# Patient Record
Sex: MALE | Race: ASIAN | NOT HISPANIC OR LATINO | Employment: UNEMPLOYED | URBAN - METROPOLITAN AREA
[De-identification: names, ages, dates, MRNs, and addresses within clinical notes are randomized per-mention and may not be internally consistent; named-entity substitution may affect disease eponyms.]

---

## 2017-10-24 ENCOUNTER — OFFICE VISIT - HEALTHEAST (OUTPATIENT)
Dept: FAMILY MEDICINE | Facility: CLINIC | Age: 27
End: 2017-10-24

## 2017-10-24 DIAGNOSIS — Z00.00 ROUTINE GENERAL MEDICAL EXAMINATION AT A HEALTH CARE FACILITY: ICD-10-CM

## 2017-10-24 DIAGNOSIS — Z82.49 FH: CAD (CORONARY ARTERY DISEASE): ICD-10-CM

## 2017-10-24 DIAGNOSIS — Z72.0 TOBACCO USE: ICD-10-CM

## 2017-10-24 LAB
CHOLEST SERPL-MCNC: 169 MG/DL
FASTING STATUS PATIENT QL REPORTED: NO
HDLC SERPL-MCNC: 36 MG/DL
LDLC SERPL CALC-MCNC: 104 MG/DL
TRIGL SERPL-MCNC: 144 MG/DL

## 2017-10-24 ASSESSMENT — MIFFLIN-ST. JEOR: SCORE: 1575.67

## 2017-10-25 ENCOUNTER — COMMUNICATION - HEALTHEAST (OUTPATIENT)
Dept: FAMILY MEDICINE | Facility: CLINIC | Age: 27
End: 2017-10-25

## 2018-01-18 ENCOUNTER — AMBULATORY - HEALTHEAST (OUTPATIENT)
Dept: NURSING | Facility: CLINIC | Age: 28
End: 2018-01-18

## 2018-01-18 ENCOUNTER — AMBULATORY - HEALTHEAST (OUTPATIENT)
Dept: FAMILY MEDICINE | Facility: CLINIC | Age: 28
End: 2018-01-18

## 2018-01-18 DIAGNOSIS — R53.83 FATIGUE: ICD-10-CM

## 2018-01-18 DIAGNOSIS — R05.9 COUGH: ICD-10-CM

## 2018-01-18 LAB
FLUAV AG SPEC QL IA: NORMAL
FLUBV AG SPEC QL IA: NORMAL

## 2018-04-30 ENCOUNTER — OFFICE VISIT - HEALTHEAST (OUTPATIENT)
Dept: FAMILY MEDICINE | Facility: CLINIC | Age: 28
End: 2018-04-30

## 2018-04-30 DIAGNOSIS — Z82.49 FH: CAD (CORONARY ARTERY DISEASE): ICD-10-CM

## 2018-04-30 DIAGNOSIS — L30.9 HAND DERMATITIS: ICD-10-CM

## 2018-04-30 DIAGNOSIS — D64.9 ANEMIA: ICD-10-CM

## 2018-04-30 DIAGNOSIS — E78.6 LOW HDL (UNDER 40): ICD-10-CM

## 2018-04-30 DIAGNOSIS — Z72.0 TOBACCO USE: ICD-10-CM

## 2018-04-30 LAB
ERYTHROCYTE [DISTWIDTH] IN BLOOD BY AUTOMATED COUNT: 11.7 % (ref 11–14.5)
FASTING STATUS PATIENT QL REPORTED: NO
HCT VFR BLD AUTO: 44.2 % (ref 40–54)
HDLC SERPL-MCNC: 35 MG/DL
HGB BLD-MCNC: 14 G/DL (ref 14–18)
MCH RBC QN AUTO: 26.9 PG (ref 27–34)
MCHC RBC AUTO-ENTMCNC: 31.8 G/DL (ref 32–36)
MCV RBC AUTO: 85 FL (ref 80–100)
PLATELET # BLD AUTO: 171 THOU/UL (ref 140–440)
PMV BLD AUTO: 8.6 FL (ref 7–10)
RBC # BLD AUTO: 5.21 MILL/UL (ref 4.4–6.2)
WBC: 4.9 THOU/UL (ref 4–11)

## 2018-04-30 RX ORDER — BETAMETHASONE DIPROPIONATE 0.5 MG/G
CREAM TOPICAL
Qty: 30 G | Refills: 1 | Status: SHIPPED | OUTPATIENT
Start: 2018-04-30 | End: 2021-08-03

## 2018-04-30 ASSESSMENT — MIFFLIN-ST. JEOR: SCORE: 1568.87

## 2018-05-01 ENCOUNTER — COMMUNICATION - HEALTHEAST (OUTPATIENT)
Dept: FAMILY MEDICINE | Facility: CLINIC | Age: 28
End: 2018-05-01

## 2018-06-06 ENCOUNTER — OFFICE VISIT - HEALTHEAST (OUTPATIENT)
Dept: FAMILY MEDICINE | Facility: CLINIC | Age: 28
End: 2018-06-06

## 2018-06-06 DIAGNOSIS — R42 DIZZINESS: ICD-10-CM

## 2018-06-06 DIAGNOSIS — R51.9 HEADACHE: ICD-10-CM

## 2018-06-06 DIAGNOSIS — R05.9 COUGH: ICD-10-CM

## 2018-06-06 DIAGNOSIS — R11.2 NAUSEA & VOMITING: ICD-10-CM

## 2018-06-06 DIAGNOSIS — R07.0 THROAT PAIN: ICD-10-CM

## 2018-06-06 LAB
ALBUMIN SERPL-MCNC: 3.8 G/DL (ref 3.5–5)
ALP SERPL-CCNC: 82 U/L (ref 45–120)
ALT SERPL W P-5'-P-CCNC: 24 U/L (ref 0–45)
ANION GAP SERPL CALCULATED.3IONS-SCNC: 12 MMOL/L (ref 5–18)
AST SERPL W P-5'-P-CCNC: 25 U/L (ref 0–40)
BILIRUB SERPL-MCNC: 0.5 MG/DL (ref 0–1)
BUN SERPL-MCNC: 16 MG/DL (ref 8–22)
CALCIUM SERPL-MCNC: 8.9 MG/DL (ref 8.5–10.5)
CHLORIDE BLD-SCNC: 105 MMOL/L (ref 98–107)
CO2 SERPL-SCNC: 24 MMOL/L (ref 22–31)
CREAT SERPL-MCNC: 0.74 MG/DL (ref 0.7–1.3)
DEPRECATED S PYO AG THROAT QL EIA: NORMAL
ERYTHROCYTE [DISTWIDTH] IN BLOOD BY AUTOMATED COUNT: 12 % (ref 11–14.5)
GFR SERPL CREATININE-BSD FRML MDRD: >60 ML/MIN/1.73M2
GLUCOSE BLD-MCNC: 82 MG/DL (ref 70–125)
HCT VFR BLD AUTO: 43.3 % (ref 40–54)
HGB BLD-MCNC: 14.1 G/DL (ref 14–18)
MCH RBC QN AUTO: 27.3 PG (ref 27–34)
MCHC RBC AUTO-ENTMCNC: 32.5 G/DL (ref 32–36)
MCV RBC AUTO: 84 FL (ref 80–100)
PLATELET # BLD AUTO: 139 THOU/UL (ref 140–440)
PMV BLD AUTO: 8.7 FL (ref 7–10)
POTASSIUM BLD-SCNC: 3.6 MMOL/L (ref 3.5–5)
PROT SERPL-MCNC: 7.5 G/DL (ref 6–8)
RBC # BLD AUTO: 5.16 MILL/UL (ref 4.4–6.2)
SODIUM SERPL-SCNC: 141 MMOL/L (ref 136–145)
WBC: 5.5 THOU/UL (ref 4–11)

## 2018-06-07 LAB — GROUP A STREP BY PCR: NORMAL

## 2018-06-14 ENCOUNTER — COMMUNICATION - HEALTHEAST (OUTPATIENT)
Dept: SCHEDULING | Facility: CLINIC | Age: 28
End: 2018-06-14

## 2018-06-19 ENCOUNTER — OFFICE VISIT - HEALTHEAST (OUTPATIENT)
Dept: FAMILY MEDICINE | Facility: CLINIC | Age: 28
End: 2018-06-19

## 2018-06-19 DIAGNOSIS — D69.6 THROMBOCYTOPENIA (H): ICD-10-CM

## 2018-06-19 DIAGNOSIS — R42 DIZZINESS: ICD-10-CM

## 2018-06-19 DIAGNOSIS — Z72.0 TOBACCO USE: ICD-10-CM

## 2018-06-19 DIAGNOSIS — B34.9 VIRAL SYNDROME: ICD-10-CM

## 2018-06-19 LAB
ERYTHROCYTE [DISTWIDTH] IN BLOOD BY AUTOMATED COUNT: 12.9 % (ref 11–14.5)
HCT VFR BLD AUTO: 40.4 % (ref 40–54)
HGB BLD-MCNC: 13.1 G/DL (ref 14–18)
MCH RBC QN AUTO: 27.1 PG (ref 27–34)
MCHC RBC AUTO-ENTMCNC: 32.3 G/DL (ref 32–36)
MCV RBC AUTO: 84 FL (ref 80–100)
PLATELET # BLD AUTO: 165 THOU/UL (ref 140–440)
PMV BLD AUTO: 7.9 FL (ref 7–10)
RBC # BLD AUTO: 4.82 MILL/UL (ref 4.4–6.2)
WBC: 5.3 THOU/UL (ref 4–11)

## 2018-06-22 ENCOUNTER — COMMUNICATION - HEALTHEAST (OUTPATIENT)
Dept: FAMILY MEDICINE | Facility: CLINIC | Age: 28
End: 2018-06-22

## 2018-12-06 ENCOUNTER — OFFICE VISIT - HEALTHEAST (OUTPATIENT)
Dept: FAMILY MEDICINE | Facility: CLINIC | Age: 28
End: 2018-12-06

## 2018-12-06 DIAGNOSIS — J06.9 UPPER RESPIRATORY TRACT INFECTION, UNSPECIFIED TYPE: ICD-10-CM

## 2018-12-06 DIAGNOSIS — Z23 NEED FOR TETANUS BOOSTER: ICD-10-CM

## 2018-12-06 DIAGNOSIS — S61.411A LACERATION OF RIGHT HAND WITHOUT FOREIGN BODY, INITIAL ENCOUNTER: ICD-10-CM

## 2018-12-17 ENCOUNTER — OFFICE VISIT - HEALTHEAST (OUTPATIENT)
Dept: FAMILY MEDICINE | Facility: CLINIC | Age: 28
End: 2018-12-17

## 2018-12-17 DIAGNOSIS — L03.113 CELLULITIS OF HAND, RIGHT: ICD-10-CM

## 2018-12-17 DIAGNOSIS — Z48.02 VISIT FOR SUTURE REMOVAL: ICD-10-CM

## 2018-12-18 ENCOUNTER — AMBULATORY - HEALTHEAST (OUTPATIENT)
Dept: FAMILY MEDICINE | Facility: CLINIC | Age: 28
End: 2018-12-18

## 2018-12-19 ENCOUNTER — RECORDS - HEALTHEAST (OUTPATIENT)
Dept: ADMINISTRATIVE | Facility: OTHER | Age: 28
End: 2018-12-19

## 2019-04-17 ENCOUNTER — OFFICE VISIT - HEALTHEAST (OUTPATIENT)
Dept: FAMILY MEDICINE | Facility: CLINIC | Age: 29
End: 2019-04-17

## 2019-04-17 DIAGNOSIS — Z82.49 FH: CAD (CORONARY ARTERY DISEASE): ICD-10-CM

## 2019-04-17 DIAGNOSIS — R51.9 NONINTRACTABLE HEADACHE, UNSPECIFIED CHRONICITY PATTERN, UNSPECIFIED HEADACHE TYPE: ICD-10-CM

## 2019-04-17 DIAGNOSIS — K29.70 GASTRITIS WITHOUT BLEEDING, UNSPECIFIED CHRONICITY, UNSPECIFIED GASTRITIS TYPE: ICD-10-CM

## 2019-04-17 DIAGNOSIS — E78.6 LOW HDL (UNDER 40): ICD-10-CM

## 2019-04-17 DIAGNOSIS — Z72.0 TOBACCO USE: ICD-10-CM

## 2019-04-17 DIAGNOSIS — Z00.00 ROUTINE GENERAL MEDICAL EXAMINATION AT A HEALTH CARE FACILITY: ICD-10-CM

## 2019-04-17 LAB
ALBUMIN SERPL-MCNC: 4.4 G/DL (ref 3.5–5)
ALP SERPL-CCNC: 81 U/L (ref 45–120)
ALT SERPL W P-5'-P-CCNC: 35 U/L (ref 0–45)
ANION GAP SERPL CALCULATED.3IONS-SCNC: 9 MMOL/L (ref 5–18)
AST SERPL W P-5'-P-CCNC: 24 U/L (ref 0–40)
BILIRUB SERPL-MCNC: 0.7 MG/DL (ref 0–1)
BUN SERPL-MCNC: 12 MG/DL (ref 8–22)
CALCIUM SERPL-MCNC: 9.8 MG/DL (ref 8.5–10.5)
CHLORIDE BLD-SCNC: 105 MMOL/L (ref 98–107)
CHOLEST SERPL-MCNC: 172 MG/DL
CO2 SERPL-SCNC: 26 MMOL/L (ref 22–31)
CREAT SERPL-MCNC: 0.76 MG/DL (ref 0.7–1.3)
ERYTHROCYTE [DISTWIDTH] IN BLOOD BY AUTOMATED COUNT: 12.1 % (ref 11–14.5)
FASTING STATUS PATIENT QL REPORTED: ABNORMAL
GFR SERPL CREATININE-BSD FRML MDRD: >60 ML/MIN/1.73M2
GLUCOSE BLD-MCNC: 79 MG/DL (ref 70–125)
HCT VFR BLD AUTO: 45.9 % (ref 40–54)
HDLC SERPL-MCNC: 38 MG/DL
HGB BLD-MCNC: 15.1 G/DL (ref 14–18)
LDLC SERPL CALC-MCNC: 99 MG/DL
MCH RBC QN AUTO: 27.1 PG (ref 27–34)
MCHC RBC AUTO-ENTMCNC: 32.9 G/DL (ref 32–36)
MCV RBC AUTO: 83 FL (ref 80–100)
PLATELET # BLD AUTO: 158 THOU/UL (ref 140–440)
PMV BLD AUTO: 9 FL (ref 7–10)
POTASSIUM BLD-SCNC: 4.2 MMOL/L (ref 3.5–5)
PROT SERPL-MCNC: 7.5 G/DL (ref 6–8)
RBC # BLD AUTO: 5.57 MILL/UL (ref 4.4–6.2)
SODIUM SERPL-SCNC: 140 MMOL/L (ref 136–145)
TRIGL SERPL-MCNC: 173 MG/DL
WBC: 4.6 THOU/UL (ref 4–11)

## 2019-04-17 ASSESSMENT — MIFFLIN-ST. JEOR: SCORE: 1556.39

## 2019-04-22 ENCOUNTER — COMMUNICATION - HEALTHEAST (OUTPATIENT)
Dept: FAMILY MEDICINE | Facility: CLINIC | Age: 29
End: 2019-04-22

## 2019-05-08 ENCOUNTER — OFFICE VISIT - HEALTHEAST (OUTPATIENT)
Dept: FAMILY MEDICINE | Facility: CLINIC | Age: 29
End: 2019-05-08

## 2019-05-08 DIAGNOSIS — M54.5 LOW BACK PAIN, UNSPECIFIED BACK PAIN LATERALITY, UNSPECIFIED CHRONICITY, WITH SCIATICA PRESENCE UNSPECIFIED: ICD-10-CM

## 2019-05-08 DIAGNOSIS — E78.1 HYPERTRIGLYCERIDEMIA: ICD-10-CM

## 2019-05-08 DIAGNOSIS — Z82.49 FH: CAD (CORONARY ARTERY DISEASE): ICD-10-CM

## 2019-05-08 DIAGNOSIS — K29.70 GASTRITIS WITHOUT BLEEDING, UNSPECIFIED CHRONICITY, UNSPECIFIED GASTRITIS TYPE: ICD-10-CM

## 2019-05-08 DIAGNOSIS — R51.9 NONINTRACTABLE HEADACHE, UNSPECIFIED CHRONICITY PATTERN, UNSPECIFIED HEADACHE TYPE: ICD-10-CM

## 2019-05-08 DIAGNOSIS — E78.6 LOW HDL (UNDER 40): ICD-10-CM

## 2019-05-08 RX ORDER — ACETAMINOPHEN 500 MG
TABLET ORAL
Qty: 80 TABLET | Refills: 1 | Status: SHIPPED | OUTPATIENT
Start: 2019-05-08 | End: 2021-08-03

## 2019-05-08 RX ORDER — FAMOTIDINE 20 MG/1
TABLET, FILM COATED ORAL
Qty: 30 TABLET | Refills: 3 | Status: SHIPPED | OUTPATIENT
Start: 2019-05-08 | End: 2021-08-03

## 2020-02-26 ENCOUNTER — OFFICE VISIT - HEALTHEAST (OUTPATIENT)
Dept: FAMILY MEDICINE | Facility: CLINIC | Age: 30
End: 2020-02-26

## 2020-02-26 DIAGNOSIS — R05.9 COUGH: ICD-10-CM

## 2020-02-26 DIAGNOSIS — J10.1 INFLUENZA A: ICD-10-CM

## 2020-02-26 LAB
FLUAV AG SPEC QL IA: ABNORMAL
FLUBV AG SPEC QL IA: ABNORMAL

## 2020-02-26 RX ORDER — BENZONATATE 100 MG/1
100 CAPSULE ORAL EVERY 6 HOURS PRN
Qty: 30 CAPSULE | Refills: 0 | Status: SHIPPED | OUTPATIENT
Start: 2020-02-26 | End: 2021-08-03

## 2020-05-04 ENCOUNTER — OFFICE VISIT - HEALTHEAST (OUTPATIENT)
Dept: FAMILY MEDICINE | Facility: CLINIC | Age: 30
End: 2020-05-04

## 2020-05-04 DIAGNOSIS — R11.10 VOMITING, INTRACTABILITY OF VOMITING NOT SPECIFIED, PRESENCE OF NAUSEA NOT SPECIFIED, UNSPECIFIED VOMITING TYPE: ICD-10-CM

## 2020-05-04 RX ORDER — ONDANSETRON 4 MG/1
4 TABLET, ORALLY DISINTEGRATING ORAL EVERY 8 HOURS PRN
Qty: 10 TABLET | Refills: 0 | Status: SHIPPED | OUTPATIENT
Start: 2020-05-04 | End: 2021-08-03

## 2021-05-26 VITALS
RESPIRATION RATE: 16 BRPM | OXYGEN SATURATION: 99 % | SYSTOLIC BLOOD PRESSURE: 119 MMHG | HEART RATE: 88 BPM | DIASTOLIC BLOOD PRESSURE: 82 MMHG | TEMPERATURE: 98.3 F

## 2021-05-28 NOTE — TELEPHONE ENCOUNTER
Called pt and relayed information to him. He stated understanding. Follow up appt has already been made.

## 2021-05-28 NOTE — PROGRESS NOTES
"Subjective: Patient comes in for a physical.  This 28-year-old male has a history of some headaches.  There are bilateral frontal.  He denies any visual changes.    Patient has been smoking 4 cigarettes a day encouraged him to quit    Patient is also having some abdominal symptoms.  He gets some gastritis symptoms sometimes has some bowel movements more frequently soon after he eats.  He is going to try some Pepcid and follow-up consider GI if not improving    Otherwise review of systems was negative.    We did check CMP CBC and lipid.    Regarding the headache no throbbing component no migraine history.  We will try some Tylenol for that.    Previous lipids have shown low HDL.  He does have family history for coronary artery disease.  No chest pain no shortness of breath  Tobacco status: He  reports that he has been smoking.  He has never used smokeless tobacco.    Patient Active Problem List    Diagnosis Date Noted     Lower Back Pain      Nonvenomous Insect Bite      Hypokalemia        Current Outpatient Medications   Medication Sig Dispense Refill     acetaminophen (TYLENOL EXTRA STRENGTH) 500 MG tablet 1-2 po tid prn pain 80 tablet 1     betamethasone dipropionate (DIPROLENE) 0.05 % cream Apply daily to affected area 30 g 1     famotidine (PEPCID) 20 MG tablet 1 po daily 30 tablet 3     varenicline (CHANTIX) 1 mg tablet 1 po daily for 3 days then 1 po bid 60 tablet 2     No current facility-administered medications for this visit.        ROS: 10 point review of systems positive as discussed above otherwise negative    Objective:    /82 (Patient Site: Left Arm, Patient Position: Sitting, Cuff Size: Adult Regular)   Pulse 66   Resp 16   Ht 5' 6\" (1.676 m)   Wt 143 lb (64.9 kg)   BMI 23.08 kg/m    Body mass index is 23.08 kg/m .      General appearance no acute distress    HEENT: Neck is supple no adenopathy oropharynx clear  No temporal artery tenderness.  Complains of the pain in through the frontal " parietal area.  Pupils react normally extract and was full    Lungs are clear no rales rhonchi heart was regular S1-S2 no murmur    Genitalia normal descended testes no evidence of hernia    Skin was normal no rashes    No joint redness warmth or swelling    Lab work showed normal CMP normal CBC, 173 HDL 38        Results for orders placed or performed in visit on 04/17/19   Comprehensive Metabolic Panel   Result Value Ref Range    Sodium 140 136 - 145 mmol/L    Potassium 4.2 3.5 - 5.0 mmol/L    Chloride 105 98 - 107 mmol/L    CO2 26 22 - 31 mmol/L    Anion Gap, Calculation 9 5 - 18 mmol/L    Glucose 79 70 - 125 mg/dL    BUN 12 8 - 22 mg/dL    Creatinine 0.76 0.70 - 1.30 mg/dL    GFR MDRD Af Amer >60 >60 mL/min/1.73m2    GFR MDRD Non Af Amer >60 >60 mL/min/1.73m2    Bilirubin, Total 0.7 0.0 - 1.0 mg/dL    Calcium 9.8 8.5 - 10.5 mg/dL    Protein, Total 7.5 6.0 - 8.0 g/dL    Albumin 4.4 3.5 - 5.0 g/dL    Alkaline Phosphatase 81 45 - 120 U/L    AST 24 0 - 40 U/L    ALT 35 0 - 45 U/L   Lipid Cascade FASTING   Result Value Ref Range    Cholesterol 172 <=199 mg/dL    Triglycerides 173 (H) <=149 mg/dL    HDL Cholesterol 38 (L) >=40 mg/dL    LDL Calculated 99 <=129 mg/dL    Patient Fasting > 8hrs? Unknown    HM2(CBC w/o Differential)   Result Value Ref Range    WBC 4.6 4.0 - 11.0 thou/uL    RBC 5.57 4.40 - 6.20 mill/uL    Hemoglobin 15.1 14.0 - 18.0 g/dL    Hematocrit 45.9 40.0 - 54.0 %    MCV 83 80 - 100 fL    MCH 27.1 27.0 - 34.0 pg    MCHC 32.9 32.0 - 36.0 g/dL    RDW 12.1 11.0 - 14.5 %    Platelets 158 140 - 440 thou/uL    MPV 9.0 7.0 - 10.0 fL       Assessment:  1. Routine general medical examination at a health care facility  Comprehensive Metabolic Panel    Lipid Cascade FASTING    HM2(CBC w/o Differential)   2. FH: CAD (coronary artery disease)     3. Low HDL (under 40)  Lipid [place] FASTING   4. Tobacco use     5. Nonintractable headache, unspecified chronicity pattern, unspecified headache type  acetaminophen  (TYLENOL EXTRA STRENGTH) 500 MG tablet   6. Gastritis without bleeding, unspecified chronicity, unspecified gastritis type  famotidine (PEPCID) 20 MG tablet     Physical fairly stable still slightly low HDL increase physical activity    Decrease carbohydrates    Encouraged to quit smoking    Use Tylenol for headaches push fluids.    Use famotidine for GI symptoms his exam was negative.  Plan: Follow-up for recheck in 3 weeks    This transcription uses voice recognition software, which may contain typographical errors.

## 2021-05-28 NOTE — TELEPHONE ENCOUNTER
----- Message from Anupam Collazo MD sent at 4/21/2019  5:00 PM CDT -----  Please contact this patient, let him know that the liver kidney electrolytes were all normal    Hemoglobin and white count were normal    The cholesterol level still showed a slightly low HDL also try to increase physical activity also triglycerides were a little high so try to decrease carbohydrates in the diet, decrease rice bread pasta sugar alcohol should pop etc.    Recheck in 3 weeks, as we discussed

## 2021-05-28 NOTE — PROGRESS NOTES
Subjective: Patient comes in for follow-up he was seen on 4/17/2019 he had some gastritis symptoms also some bloating some looser stools at times    I prescribed Pepcid but he never did get that.    Patient had elevated triglycerides low HDL I discussed that with him today, additional labs normal CBC normal CMP.    Patient also has had some low back pain and some headaches.  I prescribed Tylenol but he did get that either.    Low back pain is new there is no radicular component to that.    Denies any dysuria denies any blood in the urine.    Patient states his headache is mainly in through the back of the neck and goes up around towards his head.    No additional concerns or issues    Does have a family history for coronary artery disease which was discussed previously    Tobacco status: He  reports that he has been smoking.  He has never used smokeless tobacco.    Patient Active Problem List    Diagnosis Date Noted     Lower Back Pain      Nonvenomous Insect Bite      Hypokalemia        Current Outpatient Medications   Medication Sig Dispense Refill     acetaminophen (TYLENOL EXTRA STRENGTH) 500 MG tablet 1-2 po tid prn pain 80 tablet 1     betamethasone dipropionate (DIPROLENE) 0.05 % cream Apply daily to affected area 30 g 1     famotidine (PEPCID) 20 MG tablet 1 po daily 30 tablet 3     No current facility-administered medications for this visit.        ROS:   10 point review of systems positive as outlined above otherwise negative    Objective:    /66 (Patient Site: Left Arm, Patient Position: Sitting, Cuff Size: Adult Regular)   Pulse 80   Wt 143 lb (64.9 kg)   BMI 23.08 kg/m    Body mass index is 23.08 kg/m .      General appearance no acute distress    HEENT: Neck is negative no adenopathy oropharynx clear    Some tenderness in the paraspinal muscles    Lungs are clear no rales or rhonchi heart was regular rate at 80    Lower back with paraspinal tenderness no guarding or rebound negative straight  leg raises.    Abdomen soft no guarding or rebound no masses.    Lab work from 4/17/2019 reviewed    Results for orders placed or performed in visit on 04/17/19   Comprehensive Metabolic Panel   Result Value Ref Range    Sodium 140 136 - 145 mmol/L    Potassium 4.2 3.5 - 5.0 mmol/L    Chloride 105 98 - 107 mmol/L    CO2 26 22 - 31 mmol/L    Anion Gap, Calculation 9 5 - 18 mmol/L    Glucose 79 70 - 125 mg/dL    BUN 12 8 - 22 mg/dL    Creatinine 0.76 0.70 - 1.30 mg/dL    GFR MDRD Af Amer >60 >60 mL/min/1.73m2    GFR MDRD Non Af Amer >60 >60 mL/min/1.73m2    Bilirubin, Total 0.7 0.0 - 1.0 mg/dL    Calcium 9.8 8.5 - 10.5 mg/dL    Protein, Total 7.5 6.0 - 8.0 g/dL    Albumin 4.4 3.5 - 5.0 g/dL    Alkaline Phosphatase 81 45 - 120 U/L    AST 24 0 - 40 U/L    ALT 35 0 - 45 U/L   Lipid Cascade FASTING   Result Value Ref Range    Cholesterol 172 <=199 mg/dL    Triglycerides 173 (H) <=149 mg/dL    HDL Cholesterol 38 (L) >=40 mg/dL    LDL Calculated 99 <=129 mg/dL    Patient Fasting > 8hrs? Unknown    HM2(CBC w/o Differential)   Result Value Ref Range    WBC 4.6 4.0 - 11.0 thou/uL    RBC 5.57 4.40 - 6.20 mill/uL    Hemoglobin 15.1 14.0 - 18.0 g/dL    Hematocrit 45.9 40.0 - 54.0 %    MCV 83 80 - 100 fL    MCH 27.1 27.0 - 34.0 pg    MCHC 32.9 32.0 - 36.0 g/dL    RDW 12.1 11.0 - 14.5 %    Platelets 158 140 - 440 thou/uL    MPV 9.0 7.0 - 10.0 fL       Assessment:  1. Gastritis without bleeding, unspecified chronicity, unspecified gastritis type  famotidine (PEPCID) 20 MG tablet   2. FH: CAD (coronary artery disease)     3. Low HDL (under 40)     4. Hypertriglyceridemia     5. Low back pain, unspecified back pain laterality, unspecified chronicity, with sciatica presence unspecified  acetaminophen (TYLENOL EXTRA STRENGTH) 500 MG tablet   6. Nonintractable headache, unspecified chronicity pattern, unspecified headache type  acetaminophen (TYLENOL EXTRA STRENGTH) 500 MG tablet     We will treat with Pepcid I again sent a prescription  if persisting symptoms we will have him see GI    Low HDL and high triglycerides try to increase physical activity and decrease carbohydrates in diet    Low back pain and headache pains can use Tylenol    Discussed Chai stretching exercises.    Plan: As outlined above follow-up in 6 weeks    This transcription uses voice recognition software, which may contain typographical errors.

## 2021-05-31 VITALS — WEIGHT: 149 LBS | BODY MASS INDEX: 23.95 KG/M2 | HEIGHT: 66 IN

## 2021-06-01 VITALS — WEIGHT: 146 LBS | BODY MASS INDEX: 23.21 KG/M2

## 2021-06-01 VITALS — HEIGHT: 67 IN | BODY MASS INDEX: 22.6 KG/M2 | WEIGHT: 144 LBS

## 2021-06-01 VITALS — BODY MASS INDEX: 23.12 KG/M2 | WEIGHT: 145.4 LBS

## 2021-06-02 VITALS — WEIGHT: 146 LBS | BODY MASS INDEX: 23.21 KG/M2

## 2021-06-02 VITALS — WEIGHT: 143 LBS | HEIGHT: 66 IN | BODY MASS INDEX: 22.98 KG/M2

## 2021-06-03 VITALS — WEIGHT: 143 LBS | BODY MASS INDEX: 23.08 KG/M2

## 2021-06-04 VITALS
WEIGHT: 150 LBS | SYSTOLIC BLOOD PRESSURE: 121 MMHG | HEART RATE: 79 BPM | DIASTOLIC BLOOD PRESSURE: 78 MMHG | TEMPERATURE: 97.6 F | OXYGEN SATURATION: 99 % | BODY MASS INDEX: 24.21 KG/M2

## 2021-06-06 NOTE — PROGRESS NOTES
Assessment/Plan:         Preston Cordova was seen today for dizziness and cough.    Diagnoses and all orders for this visit:    Influenza A: cough, malaise, light-headedness. Positive for influenza A which fits with his symptoms. No other exam findings to suggest other infection as well. Will treat with symptomatic therapy including push fluids, rest, OTC analgesics as he is out of the treatment window. Tessalon for more severe cough. Discussed concerning symptoms for which to return.  -     benzonatate (TESSALON PERLES) 100 MG capsule; Take 1 capsule (100 mg total) by mouth every 6 (six) hours as needed for cough.    Cough  -     Influenza A/B Rapid Test- Nasal Swab                Plan of care was discussed with the patient and/or guardian. They verbalize understanding of the treatment options and plan of care.    Sally Everett       Subjective:        Preston Roger is a 29 y.o. male who presents for cough and dizziness.  Symptoms started 3 days ago.   Cough is mostly dry, sometimes productive. Hurts in his chest when he coughs.  No fever, but does have chills and bodyaches.  No nausea, vomiting, diarrhea.   Has a sensation like he is dizzy when he gets up quickly from sitting or laying down.     At baseline, he is healthy, has no chronic conditions.  No daily meds.  He is a smoker since his teens.   NKDA           Objective:       Blood pressure 121/78, pulse 79, temperature 97.6  F (36.4  C), temperature source Oral, weight 150 lb (68 kg), SpO2 99 %.   Gen: mildly ill appearing, no distress.   Card: RRR, no murmur, normal S1/S2. No pedal edema.  Resp: clear to auscultation bilaterally, no wheeze or crackles.   HEENT: Head - normocephalic, atraumatic   Eyes - normal lids and conjuntivae, EOMs intact   Nose - no deformity, without masses, clear rhinorrhea  Oropharynx - Oral mucosa and pharnyx normal, moist mucous membranes   Ears: TMs normal bilaterally, normal canals.     Results for orders placed or performed in  visit on 02/26/20   Influenza A/B Rapid Test- Nasal Swab   Result Value Ref Range    Influenza  A, Rapid Antigen Influenza A antigen detected (!) No Influenza A antigen detected    Influenza B, Rapid Antigen No Influenza B antigen detected No Influenza B antigen detected

## 2021-06-13 NOTE — PROGRESS NOTES
"Subjective: This patient comes in for evaluation is a 27-year-old male.  Patient is here for physical    Patient has family history for diabetes in mom and an MI in father.  Around age 54 he had an MI.    Patient works as an .    Patient is a smoker 8 cigarettes per day wants to quit we discussed options will go on nicotine patch 7 mg daily.    Patient drinks alcohol on the weekends about 20 beers per weekend.  He has been doing this for quite a long time.    On review of systems occasionally gets a headache it is sometimes throbbing it is 1 every 3 months though.    Also said some discomfort in through the chest is a sharp pain he states it feels like a harpoon.  Again it is very infrequent sharp no shortness of breath no pressure no nausea vomiting.    Patient had a flu shot here today    Otherwise no additional concerns or issues I did check labs with CMP lipid and CBC please see below    Tobacco status: He  reports that he has been smoking.  He has never used smokeless tobacco.    Patient Active Problem List    Diagnosis Date Noted     Lower Back Pain      Nonvenomous Insect Bite      Hypokalemia        Current Outpatient Prescriptions   Medication Sig Dispense Refill     nicotine (NICODERM CQ) 7 mg/24 hr Place 1 patch on the skin daily. 30 patch 0     No current facility-administered medications for this visit.        ROS:   10 point review of systems negative other than as outlined above    Objective:    /80 (Patient Site: Left Arm, Patient Position: Sitting, Cuff Size: Adult Large)  Pulse 80  Temp 97.5  F (36.4  C) (Oral)   Resp 16  Ht 5' 5.5\" (1.664 m)  Wt 149 lb (67.6 kg)  BMI 24.42 kg/m2  Body mass index is 24.42 kg/(m^2).    General appearance no acute distress    Vital signs were stable HEENT neck supple no adenopathy oropharynx clear pupils react normally extraocular movements normal no scleral icterus    Canals and TMs normal    Lungs clear no rales or rhonchi, heart regular no " murmur rate was in the 80s    Abdomen soft bowel sounds normal no guarding or rebound    Extremities without edema skin was normal pulses normal.  No rashes.    Genitalia normal descended testes.  No evidence of hernia.    Labs CMP was normal    Lipid with HDL slightly low will have him try to increase physical activity    CBC hemoglobin slightly low at 13.7.        Results for orders placed or performed in visit on 10/24/17   Comprehensive Metabolic Panel   Result Value Ref Range    Sodium 138 136 - 145 mmol/L    Potassium 4.1 3.5 - 5.0 mmol/L    Chloride 102 98 - 107 mmol/L    CO2 28 22 - 31 mmol/L    Anion Gap, Calculation 8 5 - 18 mmol/L    Glucose 81 70 - 125 mg/dL    BUN 12 8 - 22 mg/dL    Creatinine 0.84 0.70 - 1.30 mg/dL    GFR MDRD Af Amer >60 >60 mL/min/1.73m2    GFR MDRD Non Af Amer >60 >60 mL/min/1.73m2    Bilirubin, Total 0.7 0.0 - 1.0 mg/dL    Calcium 9.5 8.5 - 10.5 mg/dL    Protein, Total 7.8 6.0 - 8.0 g/dL    Albumin 4.2 3.5 - 5.0 g/dL    Alkaline Phosphatase 85 45 - 120 U/L    AST 25 0 - 40 U/L    ALT 26 0 - 45 U/L   Lipid Cascade RANDOM   Result Value Ref Range    Cholesterol 169 <=199 mg/dL    Triglycerides 144 <=149 mg/dL    HDL Cholesterol 36 (L) >=40 mg/dL    LDL Calculated 104 <=129 mg/dL    Patient Fasting > 8hrs? No    HM2(CBC w/o Differential)   Result Value Ref Range    WBC 6.3 4.0 - 11.0 thou/uL    RBC 5.11 4.40 - 6.20 mill/uL    Hemoglobin 13.7 (L) 14.0 - 18.0 g/dL    Hematocrit 43.6 40.0 - 54.0 %    MCV 85 80 - 100 fL    MCH 26.8 (L) 27.0 - 34.0 pg    MCHC 31.5 (L) 32.0 - 36.0 g/dL    RDW 11.4 11.0 - 14.5 %    Platelets 181 140 - 440 thou/uL    MPV 7.6 7.0 - 10.0 fL       Assessment:  1. Routine general medical examination at a health care facility  Comprehensive Metabolic Panel    Lipid Cascade RANDOM    HM2(CBC w/o Differential)   2. Tobacco use  nicotine (NICODERM CQ) 7 mg/24 hr   3. FH: CAD (coronary artery disease)      father MI age 54     Family history for coronary artery  disease.  Risk factors for the patient he smokes in the family history.  Also slightly low HDL.    Tobacco use please see above discussion regarding nicotine patch    Stable other labs and stable physical.  Noncardiac chest pain just monitor.  No reproduction of symptoms on palpation.  Lungs were clear.    Recheck in 4-6 months recheck CBC recheck lipid    Plan: As outlined above    This transcription uses voice recognition software, which may contain typographical errors.

## 2021-06-17 NOTE — PROGRESS NOTES
"Subjective: This 27-year-old male comes in for evaluation.  Patient has a history of mild anemia with low HDL.    Also a smoker.  I prescribed some NicoDerm back in October it helped when he was on it but once he got off the patch he started smoking and he has had about 5 cigarettes a day he also got a little rash from the patch so instead of refilling that we tried some Nicorette gum discussed how to use it and avoid smoking while he is using it.    Patient previously had drank alcohol especially on the weekends up to 20 beers.  Now he only drinks about 5 cans per month.    Labs today CBC, HDL    Patient's also had some problems with some rash on his hand it itches gets small little papules gets dry.    Lotion helps a little bit but still itches.    He has had this on and off over the years    Tobacco status: He  reports that he has been smoking.  He has never used smokeless tobacco.    Patient Active Problem List    Diagnosis Date Noted     Lower Back Pain      Nonvenomous Insect Bite      Hypokalemia        Current Outpatient Prescriptions   Medication Sig Dispense Refill     betamethasone dipropionate (DIPROLENE) 0.05 % cream Apply daily to affected area 30 g 1     nicotine polacrilex (NICORETTE) 2 mg gum Chew 1 piece  as needed for smoking cessation 100 each 1     No current facility-administered medications for this visit.        ROS:   10 point review of systems negative other than as outlined above    Objective:    /70 (Patient Site: Left Arm, Patient Position: Sitting, Cuff Size: Adult Regular)  Pulse 68  Temp 98.8  F (37.1  C) (Oral)   Resp 20  Ht 5' 6.5\" (1.689 m)  Wt 144 lb (65.3 kg)  SpO2 97% Comment: at rest with room air  BMI 22.89 kg/m2  Body mass index is 22.89 kg/(m^2).      General appearance no acute distress    HEENT: Neck negative oropharynx is clear eyes without scleral icterus    Lungs clear no rales or rhonchi O2 sat was 97%    Heart was regular rate and 60s.  Abdomen is " nontender no organomegaly    Skin without jaundice change    He does have some papular change in through the hands with some dryness.    No additional concerns or issues.    Extremities without edema.    No joint redness warmth or swelling    Labs hemoglobin is up to 14.0, HDL pending previously at 36    Results for orders placed or performed in visit on 04/30/18   HM2(CBC w/o Differential)   Result Value Ref Range    WBC 4.9 4.0 - 11.0 thou/uL    RBC 5.21 4.40 - 6.20 mill/uL    Hemoglobin 14.0 14.0 - 18.0 g/dL    Hematocrit 44.2 40.0 - 54.0 %    MCV 85 80 - 100 fL    MCH 26.9 (L) 27.0 - 34.0 pg    MCHC 31.8 (L) 32.0 - 36.0 g/dL    RDW 11.7 11.0 - 14.5 %    Platelets 171 140 - 440 thou/uL    MPV 8.6 7.0 - 10.0 fL       Assessment:  1. Tobacco use  nicotine polacrilex (NICORETTE) 2 mg gum    DISCONTINUED: nicotine (NICODERM CQ) 7 mg/24 hr   2. FH: CAD (coronary artery disease)     3. Low HDL (under 40)  HDL Cholesterol   4. Anemia  HM2(CBC w/o Differential)   5. Hand dermatitis  betamethasone dipropionate (DIPROLENE) 0.05 % cream     Tobacco abuse please see above discussion use Nicorette gum    Family history for coronary artery disease    Anemia resolved    Dermatitis in the hands continue some daily 0.05% Diprolene cream.    Alcohol use is down significantly    Plan: As outlined above we will contact patient with the results    This transcription uses voice recognition software, which may contain typographical errors.

## 2021-06-18 NOTE — PROGRESS NOTES
Chief Complaint   Patient presents with     Dizziness     Today.     Sore Throat     x 2 days.      Headache     x 2 days.          HPI    Patient is here for 2 doran of sore throat, cough, with intermittent mild dizziness and headache. He also reported nasal congestion. He said he has had vomiting and nausea, but mostly with foods. No fever, chills, chest pain, shortness of breath, abdominal pain, diarrhea. NO home meds taken so far.    ROS: Pertinent ROS noted in HPI.     No Known Allergies    Patient Active Problem List   Diagnosis     Lower Back Pain     Nonvenomous Insect Bite     Hypokalemia       Family History   Problem Relation Age of Onset     Diabetes Mother      COPD Father        Social History     Social History     Marital status:      Spouse name: N/A     Number of children: N/A     Years of education: N/A     Occupational History     Not on file.     Social History Main Topics     Smoking status: Current Every Day Smoker     Smokeless tobacco: Never Used     Alcohol use 3.0 oz/week     5 Cans of beer per week     Drug use: No     Sexual activity: Yes     Partners: Female     Birth control/ protection: None     Other Topics Concern     Not on file     Social History Narrative         Objective:    Vitals:    06/06/18 1316   BP: 110/70   Pulse: 88   Resp: 20   Temp: 98.4  F (36.9  C)   SpO2: 99%       Gen: well appearing, no distress.   Throat: oropharynx clear, mild erythema of tonsils without edema nor exudates.  Ears: TMs clear without effusion, ear canals normal with small cerumen  Nose: no discharge  Neck: enlarged and tender right anterior cervical nodes.  CV: RRR, normal S1S2,no M, R, G  Pulm: CTAB, normal effort  Abd: normal bowel sounds, soft, no pain, no mass  Neuro: alert, oriented, normal speech, normal gait.    Recent Results (from the past 24 hour(s))   Rapid Strep A Screen-Throat   Result Value Ref Range    Rapid Strep A Antigen No Group A Strep detected, presumptive negative No  Group A Strep detected, presumptive negative   HM2(CBC w/o Differential)   Result Value Ref Range    WBC 5.5 4.0 - 11.0 thou/uL    RBC 5.16 4.40 - 6.20 mill/uL    Hemoglobin 14.1 14.0 - 18.0 g/dL    Hematocrit 43.3 40.0 - 54.0 %    MCV 84 80 - 100 fL    MCH 27.3 27.0 - 34.0 pg    MCHC 32.5 32.0 - 36.0 g/dL    RDW 12.0 11.0 - 14.5 %    Platelets 139 (L) 140 - 440 thou/uL    MPV 8.7 7.0 - 10.0 fL   Comprehensive Metabolic Panel   Result Value Ref Range    Sodium 141 136 - 145 mmol/L    Potassium 3.6 3.5 - 5.0 mmol/L    Chloride 105 98 - 107 mmol/L    CO2 24 22 - 31 mmol/L    Anion Gap, Calculation 12 5 - 18 mmol/L    Glucose 82 70 - 125 mg/dL    BUN 16 8 - 22 mg/dL    Creatinine 0.74 0.70 - 1.30 mg/dL    GFR MDRD Af Amer >60 >60 mL/min/1.73m2    GFR MDRD Non Af Amer >60 >60 mL/min/1.73m2    Bilirubin, Total 0.5 0.0 - 1.0 mg/dL    Calcium 8.9 8.5 - 10.5 mg/dL    Protein, Total 7.5 6.0 - 8.0 g/dL    Albumin 3.8 3.5 - 5.0 g/dL    Alkaline Phosphatase 82 45 - 120 U/L    AST 25 0 - 40 U/L    ALT 24 0 - 45 U/L       Dizziness  -     meclizine (ANTIVERT) 25 mg tablet; Take 1 tablet (25 mg total) by mouth 3 (three) times a day as needed for dizziness or nausea.  -     HM2(CBC w/o Differential)  -     Comprehensive Metabolic Panel    Throat pain  -     Rapid Strep A Screen-Throat  -     Group A Strep, RNA Direct Detection, Throat  -     ibuprofen (ADVIL,MOTRIN) 600 MG tablet; Take 1 tablet (600 mg total) by mouth every 6 (six) hours as needed for pain.    Headache  -     ibuprofen (ADVIL,MOTRIN) 600 MG tablet; Take 1 tablet (600 mg total) by mouth every 6 (six) hours as needed for pain.    Cough    Nausea & vomiting  -     Comprehensive Metabolic Panel        Overall benign exam. Suspect viral syndrome. Noted slight thrombocytopenia and recommended f/u with PCP for recheck in a week. Encouraged fluids, rest, and f/u as directed.

## 2021-06-18 NOTE — PROGRESS NOTES
Subjective: This patient comes in for follow-up.  He seen at the walk-in clinic in [place] for sore throat cough dizziness and headache.  He had a CMP which is normal strep was negative and CBC came back normal except platelets just slightly low at 139,000.    He comes in for follow-up    He felt he had a virus treated with ibuprofen and meclizine.    Patient continues to smoke he tried quitting with the gum but that did not work.  He we discussed various options elected to use Chantix please see below    He otherwise feels like things have improved he still has a cough no sore throat no dizziness.    Use the meclizine for a while now he is done with that he does have ibuprofen which he takes periodically    Tobacco status: He  reports that he has been smoking.  He has never used smokeless tobacco.    Patient Active Problem List    Diagnosis Date Noted     Lower Back Pain      Nonvenomous Insect Bite      Hypokalemia        Current Outpatient Prescriptions   Medication Sig Dispense Refill     betamethasone dipropionate (DIPROLENE) 0.05 % cream Apply daily to affected area 30 g 1     ibuprofen (ADVIL,MOTRIN) 600 MG tablet Take 1 tablet (600 mg total) by mouth every 6 (six) hours as needed for pain. 30 tablet 0     varenicline (CHANTIX) 1 mg tablet 1 po daily for 3 days then 1 po bid 60 tablet 2     No current facility-administered medications for this visit.        ROS:   10 point review of systems negative other than as outlined above    Objective:    /78 (Patient Site: Left Arm, Patient Position: Sitting, Cuff Size: Adult Regular)  Pulse 69  Temp 97.7  F (36.5  C) (Oral)   Resp 20  Wt 146 lb (66.2 kg)  SpO2 97%  BMI 23.21 kg/m2  Body mass index is 23.21 kg/(m^2).      General appearance no acute distress    Vital signs are stable afebrile O2 sat 97%    Neck negative no adenopathy canals and TMs were normal oropharynx was clear no erythema neck without adenopathy    Lungs are clear throughout no rales  or rhonchi heart was regular S1-S2 rate at 70.    No rash in through the skin.    No peripheral edema.    Recheck CBC pending        Assessment:  1. Viral syndrome  HM2(CBC w/o Differential)   2. Tobacco use  varenicline (CHANTIX) 1 mg tablet   3. Thrombocytopenia (H)     4. Dizziness       Viral syndrome, push fluids get rest continue ibuprofen as needed    Dizziness resolved can stop meclizine    Mild thrombocytopenia recheck CBC pending    Tobacco use, was not able to quit smoking using the gum    Discuss risk-benefit regarding Chantix.  He wants to try that he will go on 1 tablet a day 1 g, for 3 days then 1 g twice daily quit smoking in a couple weeks.  Continue on the Chantix for couple months    Plan: As outlined above    This transcription uses voice recognition software, which may contain typographical errors.

## 2021-06-18 NOTE — PATIENT INSTRUCTIONS - HE
Patient Instructions by Jennifer Adorno PA-C at 5/4/2020 10:00 AM     Author: Jennifer Adorno PA-C Service: -- Author Type: Physician Assistant    Filed: 5/4/2020 11:10 AM Encounter Date: 5/4/2020 Status: Signed    : Jennifer Adorno PA-C (Physician Assistant)       If you were tested, we will call you with your COVID-19 result. You don't need to call us to check on your result. You can also use the information at the end of this document to sign up for  [employer] where you can get your results and a message about those results sent to you through the [employer] application.    Regardless of if you have been tested or not:  Patient who have symptoms (cough, fever, or shortness of breath), need to isolate for 7 days from when symptoms started AND 72 hours after fever resolves (without fever reducing medications) AND improvement of respiratory symptoms (whichever is longer).      Isolate yourself at home (in own room/own bathroom if possible)    Do Not allow any visitors    Do Not go to work or school    Do Not go to [employer],  centers, shopping, or other public places.    Do Not shake hands.    Avoid close and intimate contact with others (hugging, kissing).    Follow CDC recommendations for household cleaning of frequently touched services.     After the initial 7 days, continue to isolate yourself from household members as much as possible. To continue decrease the risk of community spread and exposure, you and any members of your household should limit activities in public for 14 days after starting home isolation.     You can reference the following CDC link for helpful home isolation/care tips:  https://www.cdc.gov/coronavirus/2019-ncov/downloads/10Things.pdf    Protect Others:    Cover Your Mouth and Nose with a mask, disposable tissue or wash cloth to avoid spreading germs to others.    Wash your hands and face frequently with soap and water    Call Back If: Breathing  difficulty develops or you become worse.    For more information about COVID19 and options for caring for yourself at home, please visit the CDC website at https://www.cdc.gov/coronavirus/2019-ncov/about/steps-when-sick.html  For more options for care at [facility], please visit our website at [email or web address]    For more information, please use the [place] Department of Health COVID-19 Website: [email or web address]/[email or web address]/[email or web address].html  [place] Department of Health ([facility]) COVID-19 Hotlines (Interpreters available):      Health questions: Phone Number: 786.200.9724 or 1-434.244.7513 and Hours: 7 a.m. to 7 p.m.    Schools and  questions: Phone Number: 750.820.5754 or 1-851.541.6984 and Hours 7 a.m. to 7 p.m.    Patient Education     Viral Gastroenteritis (Adult)    Gastroenteritis is commonly called the stomach flu. It is most often caused by a virus that affects the stomach and intestinal tract and usually lasts from 2 to 7 days. Common viruses causing gastroenteritis include norovirus, rotavirus, and hepatitis A. Non-viral causes of gastroenteritis include bacteria, parasites, and toxins.  The danger from repeated vomiting or diarrhea is dehydration. This is the loss of too much fluid from the body. When this occurs, body fluids must be replaced. Antibiotics do not help with this illness because it is usually viral.Simple home treatment will be helpful.  Symptoms of viral gastroenteritis may include:    Watery, loose stools    Stomach pain or abdominal cramps    Fever and chills    Nausea and vomiting    Loss of bowel control    Headache  Home care  Gastroenteritis is transmitted by contact with the stool or vomit of an infected person. This can occur from person to person or from contact with a contaminated surface.  Follow these guidelines when caring for yourself at home:    If symptoms are severe, rest at home for the next 24 hours or  until you are feeling better.    Wash your hands with soap and water or use alcohol-based  to prevent the spread of infection. Wash your hands after touching anyone who is sick.    Wash your hands or use alcohol-based  after using the toilet and before meals. Clean the toilet after each use.  Remember these tips when preparing food:    People with diarrhea should not prepare or serve food to others. When preparing foods, wash your hands before and after.    Wash your hands after using cutting boards, countertops, knives, or utensils that have been in contact with raw food.    Keep uncooked meats away from cooked and ready-to-eat foods.  Medicine  You may use acetaminophen or NSAID medicines like ibuprofen or naproxen to control fever unless another medicine was given. If you have chronic liver or kidney disease, talk with your healthcare provider before using these medicines. Also talk with your provider if you've had a stomach ulcer or gastrointestinal bleeding. Don't give aspirin to anyone under 18 years of age who is ill with a fever. It may cause severe liver damage. Don't use NSAIDS is you are already taking one for another condition (like arthritis) or are on aspirin (such as for heart disease or after a stroke).  If medicine for vomiting or diarrhea are prescribed, take these only as directed. Do not take over-the-counter medicines for vomiting or diarrhea unless instructed by your healthcare provider.  Diet  Follow these guidelines for food:    Water and liquids are important so you don't get dehydrated. Drink a small amount at a time or suck on ice chips if you are vomiting.    If you eat, avoid fatty, greasy, spicy, or fried foods.    Don't eat dairy if you have diarrhea. This can make diarrhea worse.    Avoid tobacco, alcohol, and caffeine which may worsen symptoms.  During the first 24 hours (the first full day), follow the diet below:    Beverages. Sports drinks, soft drinks without  caffeine, ginger ale, mineral water (plain or flavored), decaffeinated tea and coffee. If you are very dehydrated, sports drinks aren't a good choice. They have too much sugar and not enough electrolytes. In this case, commercially available products called oral rehydration solutions, are best.    Soups. Eat clear broth, consommé, and bouillon.    Desserts. Eat gelatin, popsicles, and fruit juice bars.  During the next 24 hours (the second day), you may add the following to the above:    Hot cereal, plain toast, bread, rolls, and crackers    Plain noodles, rice, mashed potatoes, chicken noodle or rice soup    Unsweetened canned fruit (avoid pineapple), bananas    Limit fat intake to less than 15 grams per day. Do this by avoiding margarine, butter, oils, mayonnaise, sauces, gravies, fried foods, peanut butter, meat, poultry, and fish.    Limit fiber and avoid raw or cooked vegetables, fresh fruits (except bananas), and bran cereals.    Limit caffeine and chocolate. Don't use spices or seasonings other than salt.    Limit dairy products.    Avoid alcohol.  During the next 24 hours:    Gradually resume a normal diet as you feel better and your symptoms improve.    If at any time it starts getting worse again, go back to clear liquids until you feel better.  Follow-up care  Follow up with your healthcare provider, or as advised. Call your provider if you don't get better within 24 hours or if diarrhea lasts more than a week. Also follow up if you are unable to keep down liquids and get dehydrated. If a stool (diarrhea) sample was taken, call as directed for the results.  Call 911  Call 911 if any of these occur:    Trouble breathing    Chest pain    Confused    Severe drowsiness or trouble awakening    Fainting or loss of consciousness    Rapid heart rate    Seizure    Stiff neck  When to seek medical advice  Call your healthcare provider right away if any of these occur:    Abdominal pain that gets worse    Continued  vomiting (unable to keep liquids down)    Frequent diarrhea (more than 5 times a day)    Blood in vomit or stool (black or red color)    Dark urine, reduced urine output, or extreme thirst    Weakness or dizziness    Drowsiness    Fever of 100.4 F (38 C) or higher, or as directed by your healthcare provider    New rash  Date Last Reviewed: 1/3/2016    6098-3686 [employer]. [address] [place] [address]. All rights reserved. This information is not intended as a substitute for professional medical care. Always follow your healthcare professional's instructions.

## 2021-06-18 NOTE — PATIENT INSTRUCTIONS - HE
Patient Instructions by Sally Everett MD at 2/26/2020  9:50 AM     Author: Sally Everett MD Service: -- Author Type: Physician    Filed: 2/26/2020 10:33 AM Encounter Date: 2/26/2020 Status: Signed    : Sally Everett MD (Physician)         Patient Education     Influenza (Adult)    Influenza is also called the flu. It is a viral illness that affects the air passages of your lungs. It is different from the common cold. The flu can easily be passed from one to person to another. It may be spread through the air by coughing and sneezing. Or it can be spread by touching the sick person and then touching your own eyes, nose, or mouth.  The flu starts 1 to 3 days after you are exposed to the flu virus. It may last for 1 to 2 weeks but many people feel tired or fatigued for many weeks afterward. You usually dont need to take antibiotics unless you have a complication. This might be an ear or sinus infection or pneumonia.  Symptoms of the flu may be mild or severe. They can include extreme tiredness (wanting to stay in bed all day), chills, fevers, muscle aches, soreness with eye movement, headache, and a dry, hacking cough.  Home care  Follow these guidelines when caring for yourself at home:    Avoid being around cigarette smoke, whether yours or other peoples.    Acetaminophen or ibuprofen will help ease your fever, muscle aches, and headache. Dont give aspirin to anyone younger than 18 who has the flu. Aspirin can harm the liver.    Nausea and loss of appetite are common with the flu. Eat light meals. Drink 6 to 8 glasses of liquids every day. Good choices are water, sport drinks, soft drinks without caffeine, juices, tea, and soup. Extra fluids will also help loosen secretions in your nose and lungs.    Over-the-counter cold medicines will not make the flu go away faster. But the medicines may help with coughing, sore throat, and congestion in your nose and sinuses. Dont use a decongestant if you  have high blood pressure.    Stay home until your fever has been gone for at least 24 hours without using medicine to reduce fever.  Follow-up care  Follow up with your healthcare provider, or as advised, if you are not getting better over the next week.  If you are age 65 or older, talk with your provider about getting a pneumococcal vaccine every 5 years. You should also get this vaccine if you have chronic asthma or COPD. All adults should get a flu vaccine every fall. Ask your provider about this.  When to seek medical advice  Call your healthcare provider right away if any of these occur:    Cough with lots of colored mucus (sputum) or blood in your mucus    Chest pain, shortness of breath, wheezing, or trouble breathing    Severe headache, or face, neck, or ear pain    New rash with fever    Fever of 100.4 F (38 C) or higher, or as directed by your healthcare provider    Confusion, behavior change, or seizure    Severe weakness or dizziness    You get a new fever or cough after getting better for a few days  Date Last Reviewed: 1/1/2017 2000-2017 [employer]. [address] [place] [address]. All rights reserved. This information is not intended as a substitute for professional medical care. Always follow your healthcare professional's instructions.

## 2021-06-20 NOTE — LETTER
Letter by Sally Everett MD at      Author: Sally Everett MD Service: -- Author Type: --    Filed:  Encounter Date: 2/26/2020 Status: (Other)         February 26, 2020     Patient: Preston Roger   YOB: 1990   Date of Visit: 2/26/2020       To Whom it May Concern:    Preston Roger was seen in my clinic on 2/26/2020. He has influenza. Please excuse his absences from work this week and he should not return to work until Monday 3/2/20.     If you have any questions or concerns, please don't hesitate to call.    Sincerely,         Electronically signed by Sally Everett MD

## 2021-06-20 NOTE — LETTER
Letter by Jennifer Adorno PA-C at      Author: Jennifer Adorno PA-C Service: -- Author Type: --    Filed:  Encounter Date: 5/4/2020 Status: (Other)         May 4, 2020     Patient: Preston Roger   YOB: 1990   Date of Visit: 5/4/2020       To Whom it May Concern:    Preston Roger was seen in my clinic on 5/4/2020. He is excused from work for 5/1/20 - 5/8/20. He may then return as long as no fevers for 72 hours and no longer having cough or shortness of breath.     If you have any questions or concerns, please don't hesitate to call.    Sincerely,         Electronically signed by Jennifer Adorno PA-C

## 2021-06-22 NOTE — PROGRESS NOTES
"Subjective:      Patient ID: Lee Roger is a 28 y.o. male.    Chief Complaint:    HPI  Lee Roger is a 28 y.o. male who presents today complaining of needing sutures removed from his right thumb.  She was seen by myself in clinic on 12/6/2018 and had sustained a laceration at the proximal palmar crease of the right thumb.  Patient did have a laceration repair was told to use a antibiotic to help prevent infection.  Patient did not  the antibiotic or take that as directed.  When I asked him why he did not take the antibiotic he said he just \"did not pick it up\".     Subsequently is now having redness swelling pain and inability to fully flex or oppose the thumb secondary to pain.    No past medical history on file.    No past surgical history on file.    Family History   Problem Relation Age of Onset     Diabetes Mother      COPD Father        Social History     Tobacco Use     Smoking status: Current Every Day Smoker     Smokeless tobacco: Never Used   Substance Use Topics     Alcohol use: Yes     Alcohol/week: 3.0 oz     Types: 5 Cans of beer per week     Drug use: No       Review of Systems  As above in HPI, otherwise negative.    Objective:     /82 (Patient Site: Right Arm, Patient Position: Sitting, Cuff Size: Adult Regular)   Pulse 78   Temp 98.1  F (36.7  C) (Oral)   Resp 16   Wt 146 lb (66.2 kg)   SpO2 99%   BMI 23.21 kg/m      Physical Exam  General patient does not appear to be febrile.  He does have some discomfort due to his right hand.  Focused examination of the right hand shows that he has swelling in the first webspace is erythema and warmth to touch.  He has decreased range of motion at the first MCP joint secondary to pain.  The sutures are removed.  There is no wound dehiscence but there is azucena pus coming out of the suture holes.  A small amount of direct palpation produces more discharge from the suture holes.    A dry dressing was placed over the laceration and held in place " with tape.    Assessment:     Procedures    1. Cellulitis of hand, right  cefTRIAXone injection 1 g (ROCEPHIN)   2. Visit for suture removal           Plan:     I am concerned for a deeper space hand infection.  Patient did not follows instructions with taking antibiotics after the laceration.    Now I think he has a hand infection and I do want him to be evaluated immediately by a hand surgeon for possible I&D and IV antibiotics.    Patient was sent to the [facility] walk-in center for immediate evaluation.  He is to be n.p.o.    Questions were answered before the patient left and he voiced understanding of the plan.

## 2021-06-26 NOTE — PROGRESS NOTES
Progress Notes by Sharan Lopes PA-C at 12/6/2018 11:40 AM     Author: Sharan Lopes PA-C Service: -- Author Type: Physician Assistant    Filed: 12/6/2018  3:07 PM Encounter Date: 12/6/2018 Status: Signed    : Shraan Lopes PA-C (Physician Assistant)     Procedure Orders    1. Laceration repair [[id number]] ordered by Sharan Lopes PA-C           Post-procedure Diagnoses    1. Laceration of right hand without foreign body, initial encounter [S61.411A]             Subjective:      Patient ID: Lee Roger is a 28 y.o. male.    Chief Complaint:    HPI  Lee Roger is a 28 y.o. male who presents today complaining of 2 separate complaints.    The first complaint is that he had sustained a laceration to the palmar aspect of the right hand at the base of the thumb last night.  Patient states he had glass the glass broke and he sustained a laceration along the sharp edges as he was cleaning up the broken glass.  Denies foreign body sensation or retained foreign body into the right hand.  Additionally patient is unable to recall his last tetanus booster and he will need a tetanus immunization in the office.  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    His second concern is that he has had difficulty with runny nose cough and sneezing.  He has had a low-grade headache and dry nonproductive cough but no overt fever chills night sweats arthralgias myalgias vomiting diarrhea skin rash abdominal pain or urinary symptoms.  He is not tried any treatment for this over-the-counter.    No past medical history on file.    No past surgical history on file.    Family History   Problem Relation Age of Onset   ? Diabetes Mother    ? COPD Father        Social History     Tobacco Use   ? Smoking status: Current Every Day Smoker   ? Smokeless tobacco: Never Used   Substance Use Topics   ? Alcohol use: Yes     Alcohol/week: 3.0 oz     Types: 5 Cans of beer per week   ? Drug use: No        Review of Systems  As above in HPI, otherwise negative.    Objective:     /68 (Patient Site: Right Arm, Patient Position: Sitting, Cuff Size: Adult Large)   Pulse 83   Temp 98.5  F (36.9  C) (Oral)   Resp 18   Wt 146 lb (66.2 kg)   SpO2 99%   BMI 23.21 kg/m      Physical Exam  General: Patient is resting comfortably no acute distress is afebrile  HEENT: Head is normocephalic atraumatic   eyes are PERRL EOMI sclera anicteric   TMs are clear bilaterally  Throat is clear no exudate  No cervical lymphadenopathy present  LUNGS: Clear to auscultation bilaterally  HEART: Regular rate and rhythm  Skin: Without rash non-diaphoretic  Musculoskeletal: Examination of the right hand shows that he has a 1 cm laceration at the proximal palmar flexor crease at the base of the right thumb  He is sensory intact light touch with proximal equal distal and radial equal to ulnar.  He is able to have good opposition of the thumb from the first metacarpal to the distal end of the fifth metacarpal.  So with the palm up on the table he has good abduction of the thumb with the thenar muscle to resistance he is tendon intact to resistance to flexion of the thumb as well.      Assessment:     Laceration repair  Date/Time: 12/6/2018 3:01 PM  Performed by: Sharan Lopes PA-C  Authorized by: Sharan Lopes PA-C   Body area: upper extremity  Location details: right hand  Laceration length: 1.5 cm  Foreign bodies: no foreign bodies  Tendon involvement: none  Nerve involvement: none  Vascular damage: no  Anesthesia: local infiltration    Anesthesia:  Local Anesthetic: lidocaine 1% with epinephrine  Anesthetic total: 3 mL    Sedation:  Patient sedated: no    Preparation: Patient was prepped and draped in the usual sterile fashion.  Irrigation solution: saline  Amount of cleaning: standard  Debridement: none  Degree of undermining: none  Skin closure: 5-0 nylon and 4-0 nylon  Number of sutures: 4  Technique: simple  Approximation:  loose  Approximation difficulty: simple  Dressing: Usual dressing with bacitracin, Adaptic 4 x 4 and adhesive or Band-Aid.  Patient tolerance: Patient tolerated the procedure well with no immediate complications  Comments: A small circular hole was cut on the wound margin in the middle to allow for spontaneous drainage.  This was also coordinated with a loose closure since there was delayed presentation to allow for drainage.          The primary encounter diagnosis was Laceration of right hand without foreign body, initial encounter. A diagnosis of Upper respiratory tract infection, unspecified type was also pertinent to this visit.    Plan:     1. Laceration of right hand without foreign body, initial encounter  Nursing communication    lidocaine 1%-EPINEPHrine 1:100,000 1 %-1:100,000 injection 1 mL (XYLOCAINE W/EPI)    cephalexin (KEFLEX) 500 MG capsule    Laceration repair    DISCONTINUED: lidocaine 1%-EPINEPHrine 1:100,000 1 %-1:100,000 injection 5 mL (XYLOCAINE W/EPI)   2. Upper respiratory tract infection, unspecified type     3. Need for tetanus booster         All of the above history physical and treatment plan was done through our [employer]  in the office today.    Had a conversation with the patient regarding his cold like symptoms.  We will treat that conservatively with increased rest increase fluids bedside modification over-the-counter medications such as Tylenol and decongestant and cough preparation.  Indication for return was gone over.    In regards to the laceration, laceration was repaired in a primary fashion and he was told to keep the wound clean dry and covered.  No use of the affected right hand until he is seen back for suture removal in 10-12 days.  Questions were answered to patient's satisfaction before discharge.      Patient Instructions   Your wound was closed with sutures and covered with antibiotic ointment and a non-adhesive dressing.    General wound maintenance:  -  Dressings should be left in place for 24 hours, afterwards wound may be left open to air  - May continue to cover injury site with a topical antibiotic such as neosporin  - A simple band-aid will work to protect small wounds and keep topical antibiotic on  - Cloth gauze are more useful for covering larger wounds with tape to secure  - May shower normally if it has been longer than 12 hours since sutures were placed    Avoid any swimming until after sutures have been removed.    Follow-up in 10-12 days to remove your sutures.    Come back sooner if you develop fever or your wound site becomes increasingly red, tender, producing pus or other drainage, or reopens.      Patient Education     Extremity Laceration: Stitches, Staples, or Tape  A laceration is a cut through the skin. If it is deep, it may require stitches or staples to close so it can heal. Minor cuts may be treated with surgical tape closures, or skin glue.  X-rays may be done if something may have entered the skin through the cut. You may also need a tetanus shot if you are not up to date on this vaccine.  Home care    Follow the healthcare providers instructions on how to care for the cut.    Wash your hands with soap and warm water before and after caring for your wound. This is to help prevent infection.    Keep the wound clean and dry. If a bandage was applied and it becomes wet or dirty, replace it. Otherwise, leave it in place for the first 24 hours, then change it once a day or as directed.    If stitches or staples were used, clean the wound daily:  ? After removing the bandage, wash the area with soap and water. Use a wet cotton swab to loosen and remove any blood or crust that forms.  ? After cleaning, keep the wound clean and dry. Talk with your healthcare provider before putting any antibiotic ointment on the wound. Reapply the bandage.    You may remove the bandage to shower as usual after the first 24 hours, but don't soak the area in water  (no swimming) until the stitches or staples are removed.    If surgical tape closures were used, keep the area clean and dry. If it becomes wet, blot it dry with a towel. Let the surgical tape fall off on its own.    The healthcare provider may prescribe an antibiotic cream or ointment to prevent infection. He or she may also prescribe an antibiotic pill. Don't stop taking this medicine until you have finished it all or the provider tells you to stop.    The provider may also prescribe medicine for pain. Follow the instructions for taking these medicines.    Don't do activities that may reopen your wound.  Follow-up care  Follow up with your healthcare provider, or as advised. Most skin wounds heal within 10 days. But an infection may sometimes occur even with proper treatment. Check the wound daily for the signs of infection listed below. Stitches and staples should be removed within 7 to14 days. If surgical tape closures were used, you may remove them after 10 days if they have not fallen off by then.   When to seek medical advice  Call your healthcare provider right away if any of these occur:    Wound bleeding not controlled by direct pressure    Signs of infection, including increasing pain in the wound, increasing wound redness or swelling, or pus or bad odor coming from the wound    Fever of 100.4 F (38 C) or higher, or as directed by your healthcare provider    Stitches or staples come apart or fall out or surgical tape falls off before 7 days    Wound edges reopen    Wound changes colors    Numbness occurs around the wound     Decreased movement around the injured area  Date Last Reviewed: 7/1/2017 2000-2017 [employer]. [address] [place] [address], [place] [address]. All rights reserved. This information is not intended as a substitute for professional medical care. Always follow your healthcare professional's instructions.

## 2021-06-27 ENCOUNTER — HEALTH MAINTENANCE LETTER (OUTPATIENT)
Age: 31
End: 2021-06-27

## 2021-06-29 NOTE — PROGRESS NOTES
Progress Notes by Jennifer Adorno PA-C at 5/4/2020 10:00 AM     Author: Jennifer Adorno PA-C Service: -- Author Type: Physician Assistant    Filed: 5/4/2020 11:31 AM Encounter Date: 5/4/2020 Status: Signed    : Jennifer Adorno PA-C (Physician Assistant)         Assessment & Plan:       1. Vomiting, intractability of vomiting not specified, presence of nausea not specified, unspecified vomiting type  ondansetron (ZOFRAN-ODT) 4 MG disintegrating tablet         Medical Decision Making  Patient presents with nausea and vomiting due to unknown cause.  It is suspected most likely that the patient is suffering from a viral gastroenteritis.  Did also have concerns for possible COVID-19 as patient was stating that he felt short of breath.  Despite his complaint of shortness of breath, patient's vital signs appear stable and he does not appear in respiratory distress at this time.  Furthermore, patient has clear lung sounds on auscultation.  There are no signs of strep pharyngitis patient denies sore throat.  Also no tenderness to palpation of the abdomen to make concerns for appendicitis versus diverticulitis extremely low.  Will treat patient's nausea with Zofran and told him to closely monitor symptoms at this time.  Discussed prevention of spreading illness as well as appropriate diet up with nausea.  Promoted drinking plenty of clear fluids.  Provided a work note.  Discussed signs of worsening symptoms and when to follow-up with PCP if no symptom improvement.    Patient Instructions   If you were tested, we will call you with your COVID-19 result. You don't need to call us to check on your result. You can also use the information at the end of this document to sign up for [facility] [employer] where you can get your results and a message about those results sent to you through the [employer] application.    Regardless of if you have been tested or not:  Patient who have symptoms (cough, fever, or  shortness of breath), need to isolate for 7 days from when symptoms started AND 72 hours after fever resolves (without fever reducing medications) AND improvement of respiratory symptoms (whichever is longer).      Isolate yourself at home (in own room/own bathroom if possible)    Do Not allow any visitors    Do Not go to work or school    Do Not go to Faith,  centers, shopping, or other public places.    Do Not shake hands.    Avoid close and intimate contact with others (hugging, kissing).    Follow CDC recommendations for household cleaning of frequently touched services.     After the initial 7 days, continue to isolate yourself from household members as much as possible. To continue decrease the risk of community spread and exposure, you and any members of your household should limit activities in public for 14 days after starting home isolation.     You can reference the following CDC link for helpful home isolation/care tips:  https://www.cdc.gov/coronavirus/2019-ncov/downloads/10Things.pdf    Protect Others:    Cover Your Mouth and Nose with a mask, disposable tissue or wash cloth to avoid spreading germs to others.    Wash your hands and face frequently with soap and water    Call Back If: Breathing difficulty develops or you become worse.    For more information about COVID19 and options for caring for yourself at home, please visit the CDC website at https://www.cdc.gov/coronavirus/2019-ncov/about/steps-when-sick.html  For more options for care at [facility], please visit our website at [email or web address]    For more information, please use the [facility] of Health COVID-19 Website: https://www.health.state.[place]/diseases/coronavirus/index.html  [place] Department of Health ([facility]) COVID-19 Hotlines (Interpreters available):      Health questions: Phone Number: 640.119.8632 or 1-762.896.8390 and Hours: 7 a.m. to 7 p.m.    Schools and   questions: Phone Number: [phone] or 1-692.705.9419 and Hours 7 a.m. to 7 p.m.    Patient Education     Viral Gastroenteritis (Adult)    Gastroenteritis is commonly called the stomach flu. It is most often caused by a virus that affects the stomach and intestinal tract and usually lasts from 2 to 7 days. Common viruses causing gastroenteritis include norovirus, rotavirus, and hepatitis A. Non-viral causes of gastroenteritis include bacteria, parasites, and toxins.  The danger from repeated vomiting or diarrhea is dehydration. This is the loss of too much fluid from the body. When this occurs, body fluids must be replaced. Antibiotics do not help with this illness because it is usually viral.Simple home treatment will be helpful.  Symptoms of viral gastroenteritis may include:    Watery, loose stools    Stomach pain or abdominal cramps    Fever and chills    Nausea and vomiting    Loss of bowel control    Headache  Home care  Gastroenteritis is transmitted by contact with the stool or vomit of an infected person. This can occur from person to person or from contact with a contaminated surface.  Follow these guidelines when caring for yourself at home:    If symptoms are severe, rest at home for the next 24 hours or until you are feeling better.    Wash your hands with soap and water or use alcohol-based  to prevent the spread of infection. Wash your hands after touching anyone who is sick.    Wash your hands or use alcohol-based  after using the toilet and before meals. Clean the toilet after each use.  Remember these tips when preparing food:    People with diarrhea should not prepare or serve food to others. When preparing foods, wash your hands before and after.    Wash your hands after using cutting boards, countertops, knives, or utensils that have been in contact with raw food.    Keep uncooked meats away from cooked and ready-to-eat foods.  Medicine  You may use acetaminophen or NSAID  medicines like ibuprofen or naproxen to control fever unless another medicine was given. If you have chronic liver or kidney disease, talk with your healthcare provider before using these medicines. Also talk with your provider if you've had a stomach ulcer or gastrointestinal bleeding. Don't give aspirin to anyone under 18 years of age who is ill with a fever. It may cause severe liver damage. Don't use NSAIDS is you are already taking one for another condition (like arthritis) or are on aspirin (such as for heart disease or after a stroke).  If medicine for vomiting or diarrhea are prescribed, take these only as directed. Do not take over-the-counter medicines for vomiting or diarrhea unless instructed by your healthcare provider.  Diet  Follow these guidelines for food:    Water and liquids are important so you don't get dehydrated. Drink a small amount at a time or suck on ice chips if you are vomiting.    If you eat, avoid fatty, greasy, spicy, or fried foods.    Don't eat dairy if you have diarrhea. This can make diarrhea worse.    Avoid tobacco, alcohol, and caffeine which may worsen symptoms.  During the first 24 hours (the first full day), follow the diet below:    Beverages. Sports drinks, soft drinks without caffeine, ginger ale, mineral water (plain or flavored), decaffeinated tea and coffee. If you are very dehydrated, sports drinks aren't a good choice. They have too much sugar and not enough electrolytes. In this case, commercially available products called oral rehydration solutions, are best.    Soups. Eat clear broth, consommé, and bouillon.    Desserts. Eat gelatin, popsicles, and fruit juice bars.  During the next 24 hours (the second day), you may add the following to the above:    Hot cereal, plain toast, bread, rolls, and crackers    Plain noodles, rice, mashed potatoes, chicken noodle or rice soup    Unsweetened canned fruit (avoid pineapple), bananas    Limit fat intake to less than 15 grams  per day. Do this by avoiding margarine, butter, oils, mayonnaise, sauces, gravies, fried foods, peanut butter, meat, poultry, and fish.    Limit fiber and avoid raw or cooked vegetables, fresh fruits (except bananas), and bran cereals.    Limit caffeine and chocolate. Don't use spices or seasonings other than salt.    Limit dairy products.    Avoid alcohol.  During the next 24 hours:    Gradually resume a normal diet as you feel better and your symptoms improve.    If at any time it starts getting worse again, go back to clear liquids until you feel better.  Follow-up care  Follow up with your healthcare provider, or as advised. Call your provider if you don't get better within 24 hours or if diarrhea lasts more than a week. Also follow up if you are unable to keep down liquids and get dehydrated. If a stool (diarrhea) sample was taken, call as directed for the results.  Call 911  Call 911 if any of these occur:    Trouble breathing    Chest pain    Confused    Severe drowsiness or trouble awakening    Fainting or loss of consciousness    Rapid heart rate    Seizure    Stiff neck  When to seek medical advice  Call your healthcare provider right away if any of these occur:    Abdominal pain that gets worse    Continued vomiting (unable to keep liquids down)    Frequent diarrhea (more than 5 times a day)    Blood in vomit or stool (black or red color)    Dark urine, reduced urine output, or extreme thirst    Weakness or dizziness    Drowsiness    Fever of 100.4 F (38 C) or higher, or as directed by your healthcare provider    New rash  Date Last Reviewed: 1/3/2016    9411-1780 [employer]. [address] [place], [place] [id number]. All rights reserved. This information is not intended as a substitute for professional medical care. Always follow your healthcare professional's instructions.                 Subjective:       History provided by the patient.  He required a phone .  Preston Roger  is a 29 y.o. male here for evaluation of nausea and emesis.  Onset of symptoms is 1 week ago with little improvement since then.  Patient has had approximately 2 episodes of emesis per day.  He has had no episodes of emesis as of today, however he is not been eating much.  Associated symptoms include occasional lightheadedness, sensation of shortness of breath, and poor appetite.  Patient denies fevers, cough, sore throat, hematemesis, abdominal pains, back pains, and urinary symptoms.  He has no history of asthma.  No recent travel or antibiotic use.    The following portions of the patient's history were reviewed and updated as appropriate: allergies, current medications and problem list.    Review of Systems  A 12 point comprehensive review of systems was negative except as noted.     Allergies  No Known Allergies    Family History   Problem Relation Age of Onset   ? Diabetes Mother    ? COPD Father        Social History     Socioeconomic History   ? Marital status:      Spouse name: None   ? Number of children: None   ? Years of education: None   ? Highest education level: None   Occupational History   ? None   Social Needs   ? Financial resource strain: None   ? Food insecurity     Worry: None     Inability: None   ? Transportation needs     Medical: None     Non-medical: None   Tobacco Use   ? Smoking status: Current Every Day Smoker   ? Smokeless tobacco: Never Used   Substance and Sexual Activity   ? Alcohol use: Yes     Alcohol/week: 5.0 standard drinks     Types: 5 Cans of beer per week   ? Drug use: No   ? Sexual activity: Yes     Partners: Female     Birth control/protection: None   Lifestyle   ? Physical activity     Days per week: None     Minutes per session: None   ? Stress: None   Relationships   ? Social connections     Talks on phone: None     Gets together: None     Attends Mandaeism service: None     Active member of club or organization: None     Attends meetings of clubs or  organizations: None     Relationship status: None   ? Intimate partner violence     Fear of current or ex partner: None     Emotionally abused: None     Physically abused: None     Forced sexual activity: None   Other Topics Concern   ? None   Social History Narrative   ? None         Objective:       /82   Pulse 88   Temp 98.3  F (36.8  C)   Resp 16   SpO2 99%   General appearance: alert, appears stated age, cooperative, no distress and non-toxic  Head: Normocephalic, without obvious abnormality, atraumatic  Ears: normal TM's and external ear canals both ears  Nose: no discharge  Throat: lips, mucosa, and tongue normal; teeth and gums normal  Neck: no adenopathy and supple, symmetrical, trachea midline  Back: No CVA tenderness  Lungs: clear to auscultation bilaterally  Heart: regular rate and rhythm, S1, S2 normal, no murmur, click, rub or gallop  Abdomen: soft, non-tender; bowel sounds normal; no masses,  no organomegaly  Skin: Skin color, texture, turgor normal. No rashes or lesions

## 2021-07-05 ENCOUNTER — HOSPITAL ENCOUNTER (EMERGENCY)
Dept: EMERGENCY MEDICINE | Facility: HOSPITAL | Age: 31
Discharge: HOME | End: 2021-07-05
Payer: COMMERCIAL

## 2021-07-05 DIAGNOSIS — H10.9 CONJUNCTIVITIS OF RIGHT EYE, UNSPECIFIED CONJUNCTIVITIS TYPE: ICD-10-CM

## 2021-07-05 RX ORDER — ERYTHROMYCIN 5 MG/G
OINTMENT OPHTHALMIC EVERY 6 HOURS
Qty: 3.5 G | Refills: 0 | Status: SHIPPED | OUTPATIENT
Start: 2021-07-05 | End: 2021-07-12

## 2021-07-05 ASSESSMENT — MIFFLIN-ST. JEOR: SCORE: 1617.64

## 2021-07-06 VITALS — WEIGHT: 160 LBS | BODY MASS INDEX: 26.66 KG/M2 | HEIGHT: 65 IN

## 2021-07-06 NOTE — ED PROVIDER NOTES
ED Provider Notes by Diana Jackson PA-C at 7/5/2021 10:17 PM     Author: Diana Jackson PA-C Service: Emergency Medicine Author Type: Physician Assistant    Filed: 7/5/2021 11:49 PM Date of Service: 7/5/2021 10:17 PM Status: Signed    : Diana Jackson PA-C (Physician Assistant)       EMERGENCY DEPARTMENT ENCOUNTER      NAME: Preston Roger  AGE: 30 y.o. male  YOB: 1990  MRN: [id number]  EVALUATION DATE & TIME: 7/5/2021 10:09 PM    PCP: Anupam Collazo MD    ED PROVIDER: Diana Jackson PA-C      Chief Complaint   Patient presents with   ? Eyelid Pain         FINAL IMPRESSION:  1. Conjunctivitis of right eye, unspecified conjunctivitis type          MEDICAL DECISION MAKING:    Pertinent Labs & Imaging studies reviewed. (See chart for details)  30 y.o. male presents to the Emergency Department for evaluation of painful, red right eye.  Woke up this morning with the symptoms.  Does not recall getting anything in the eye but does have a foreign body sensation.  [place]  Wears glasses, does not wear contacts.    Here vitals are stable and he is afebrile.  Clinically appears well, nontoxic.  He has mild conjunctival injection and a small corneal abrasion over the distal cornea seen with fluorescein dye.  The pH is normal.  His vision is actually better in the affected eye.    I am suspicious that he may have a little bit of irritant conjunctivitis, perhaps from bug spray used this weekend.  With the corneal abrasion though we will cover him with erythromycin ointment and encouraged him to be seen tomorrow in clinic.  Gave information for the sample in clinic and told him to call right away in the morning.  Signs and symptoms that should prompt return to the ER including vision changes, headaches, swelling, redness or explained.    At the conclusion of the encounter I discussed the results of all of the tests and the disposition. The questions were answered.  The patient or family acknowledged understanding and was agreeable with the care plan.       ED COURSE  10:25 PM Met and evaluated patient. Discussed ED plan.   11:07 PM I discussed the plan for discharge with the patient, and patient is agreeable. We discussed supportive cares at home and reasons for return to the ER including new or worsening symptoms - all questions and concerns addressed. Patient to be discharged by RN.       PPE: Provider wore gloves, N95 mask, eye protection, surgical cap, and paper mask.  MEDICATIONS GIVEN IN THE EMERGENCY:  Medications   proparacaine 0.5 % ophthalmic solution 1 drop (ALCAINE) (has no administration in time range)   fluorescein ophthalmic strip 1 strip (for FLUOR-I-STRIP) (has no administration in time range)       NEW PRESCRIPTIONS STARTED AT TODAY'S ER VISIT  New Prescriptions    ERYTHROMYCIN OPHTHALMIC OINTMENT    Administer to the right eye every 6 (six) hours for 7 days.          =================================================================    HPI    Patient information was obtained from: Patient     Use of Intrepreter: Yes: Patient's fiance- Language: Braden Roger is a 30 y.o. male who presents to the ED via walk-in or evaluation of right eye pain.    This morning (7/5), the patient started to feel pain in his right eye with associated redness. He describes the pain as in his right eye and the surrounding area. He states that it feels like something is in his eye, although he does not remember anything going into his eye. The patient wears glasses and denies having contacts. The patient denies blurry or double vision and states that he can see out of his eye. However, the patient does endorse a headache to the posterior of his head. He has never had similar symptoms before. He denies any other medical problems.      Of note, the patient was at the cabin the entire weekend and saw fireworks, but they were far away. In addition, he did spray bug spray  "and although he does not remember, some could have got into his eye.    REVIEW OF SYSTEMS   Vision: Negative for vision changes. Positive for eye pain and eye redness  HENT:  Positive for headache.      PAST MEDICAL HISTORY:  History reviewed. No pertinent past medical history.    PAST SURGICAL HISTORY:  History reviewed. No pertinent surgical history.        CURRENT MEDICATIONS:    No current facility-administered medications on file prior to encounter.      Current Outpatient Medications on File Prior to Encounter   Medication Sig   ? acetaminophen (TYLENOL EXTRA STRENGTH) 500 MG tablet 1-2 po tid prn pain   ? benzonatate (TESSALON PERLES) 100 MG capsule Take 1 capsule (100 mg total) by mouth every 6 (six) hours as needed for cough.   ? betamethasone dipropionate (DIPROLENE) 0.05 % cream Apply daily to affected area   ? famotidine (PEPCID) 20 MG tablet 1 po daily   ? ondansetron (ZOFRAN-ODT) 4 MG disintegrating tablet Take 1 tablet (4 mg total) by mouth every 8 (eight) hours as needed for nausea.       ALLERGIES:  No Known Allergies    FAMILY HISTORY:  Family History   Problem Relation Age of Onset   ? Diabetes Mother    ? COPD Father        SOCIAL HISTORY:   Other: The patient is currently unemployed.    VITALS:  Patient Vitals for the past 24 hrs:   BP Temp Pulse Resp SpO2 Height Weight   07/05/21 2206 132/87 98.5  F (36.9  C) 90 16 97 % 5' 5\" (1.651 m) 160 lb (72.6 kg)       PHYSICAL EXAM    General Appearance:  Alert, cooperative, no distress, appears stated age  HENT: Normocephalic without obvious deformity, atraumatic. Mucous membranes moist   Eyes: Mild right conjunctival injection. PERRL. EOMI. No periorbital redness or warmth. Maybe very minimal eyelid swelling. No tenderness. Small fluorescein uptake in very peripheral cornea at 9:00 position with mild conjunctival swelling. PH is 7. Vision OS: 20/40, OD: 20/25  Respiratory: No distress.   Musculoskeletal: Moving all extremities. No swelling. "   Integument: Warm, dry, no rashes or lesions  Neurologic: Alert and orientated x3. No focal deficits.  Psych: Normal mood and affect        LAB:  All pertinent labs reviewed and interpreted.  No results found for this visit on 07/05/21.    RADIOLOGY:  Reviewed all pertinent imaging. Please see official radiology report    No results found.    EKG:    None      I, Noy Naqvi, am serving as a scribe to document services personally performed by Diana Jackson PA-C based on my observation and the provider's statements to me. I, Diana Jackson PA-C attest that Noy Naqvi is acting in a scribe capacity, has observed my performance of the services and has documented them in accordance with my direction.      Diana Jackson PA-C  Emergency Medicine  [facility] EMERGENCY DEPARTMENT  [address]  [facility] [address]  Dept: [phone]  Loc: [phone]       Diana Jackson PA-C  07/05/21 7432

## 2021-07-06 NOTE — ED TRIAGE NOTES
ED Triage Notes by Kayleigh Boland RN at 7/5/2021 10:06 PM     Author: Kayleigh Boland RN Service: -- Author Type: Registered Nurse    Filed: 7/5/2021 10:06 PM Date of Service: 7/5/2021 10:06 PM Status: Signed    : Kayleigh Boland RN (Registered Nurse)       Pt c/o redness and watering of right eye starting this morning

## 2021-08-03 ENCOUNTER — APPOINTMENT (OUTPATIENT)
Dept: INTERPRETER SERVICES | Facility: CLINIC | Age: 31
End: 2021-08-03
Payer: COMMERCIAL

## 2021-08-03 ENCOUNTER — OFFICE VISIT (OUTPATIENT)
Dept: FAMILY MEDICINE | Facility: CLINIC | Age: 31
End: 2021-08-03
Payer: COMMERCIAL

## 2021-08-03 VITALS
DIASTOLIC BLOOD PRESSURE: 78 MMHG | SYSTOLIC BLOOD PRESSURE: 122 MMHG | HEIGHT: 66 IN | HEART RATE: 81 BPM | BODY MASS INDEX: 25.46 KG/M2 | OXYGEN SATURATION: 98 % | WEIGHT: 158.44 LBS

## 2021-08-03 DIAGNOSIS — D69.6 THROMBOCYTOPENIA (H): ICD-10-CM

## 2021-08-03 DIAGNOSIS — E56.9 VITAMIN DEFICIENCY: ICD-10-CM

## 2021-08-03 DIAGNOSIS — Z13.1 SCREENING FOR DIABETES MELLITUS: ICD-10-CM

## 2021-08-03 DIAGNOSIS — L30.9 DERMATITIS: Primary | ICD-10-CM

## 2021-08-03 DIAGNOSIS — Z00.00 WELL ADULT EXAM: ICD-10-CM

## 2021-08-03 DIAGNOSIS — M54.50 ACUTE MIDLINE LOW BACK PAIN WITHOUT SCIATICA: ICD-10-CM

## 2021-08-03 DIAGNOSIS — Z13.220 SCREENING FOR LIPID DISORDERS: ICD-10-CM

## 2021-08-03 LAB
ALBUMIN SERPL-MCNC: 4.4 G/DL (ref 3.5–5)
ALP SERPL-CCNC: 95 U/L (ref 45–120)
ALT SERPL W P-5'-P-CCNC: 47 U/L (ref 0–45)
ANION GAP SERPL CALCULATED.3IONS-SCNC: 10 MMOL/L (ref 5–18)
AST SERPL W P-5'-P-CCNC: 31 U/L (ref 0–40)
BASOPHILS # BLD AUTO: 0.1 10E3/UL (ref 0–0.2)
BASOPHILS NFR BLD AUTO: 1 %
BILIRUB SERPL-MCNC: 0.6 MG/DL (ref 0–1)
BUN SERPL-MCNC: 15 MG/DL (ref 8–22)
CALCIUM SERPL-MCNC: 9.7 MG/DL (ref 8.5–10.5)
CHLORIDE BLD-SCNC: 104 MMOL/L (ref 98–107)
CHOLEST SERPL-MCNC: 172 MG/DL
CO2 SERPL-SCNC: 26 MMOL/L (ref 22–31)
CREAT SERPL-MCNC: 0.68 MG/DL (ref 0.7–1.3)
EOSINOPHIL # BLD AUTO: 0.4 10E3/UL (ref 0–0.7)
EOSINOPHIL NFR BLD AUTO: 7 %
ERYTHROCYTE [DISTWIDTH] IN BLOOD BY AUTOMATED COUNT: 13.3 % (ref 10–15)
FASTING STATUS PATIENT QL REPORTED: NO
GFR SERPL CREATININE-BSD FRML MDRD: >90 ML/MIN/1.73M2
GLUCOSE BLD-MCNC: 93 MG/DL (ref 70–125)
HCT VFR BLD AUTO: 44.1 % (ref 40–53)
HDLC SERPL-MCNC: 32 MG/DL
HGB BLD-MCNC: 14.3 G/DL (ref 13.3–17.7)
IMM GRANULOCYTES # BLD: 0 10E3/UL
IMM GRANULOCYTES NFR BLD: 0 %
LDLC SERPL CALC-MCNC: ABNORMAL MG/DL
LYMPHOCYTES # BLD AUTO: 1.9 10E3/UL (ref 0.8–5.3)
LYMPHOCYTES NFR BLD AUTO: 34 %
MCH RBC QN AUTO: 27.1 PG (ref 26.5–33)
MCHC RBC AUTO-ENTMCNC: 32.4 G/DL (ref 31.5–36.5)
MCV RBC AUTO: 84 FL (ref 78–100)
MONOCYTES # BLD AUTO: 0.5 10E3/UL (ref 0–1.3)
MONOCYTES NFR BLD AUTO: 8 %
NEUTROPHILS # BLD AUTO: 2.9 10E3/UL (ref 1.6–8.3)
NEUTROPHILS NFR BLD AUTO: 51 %
PLATELET # BLD AUTO: 179 10E3/UL (ref 150–450)
POTASSIUM BLD-SCNC: 4.2 MMOL/L (ref 3.5–5)
PROT SERPL-MCNC: 8.2 G/DL (ref 6–8)
RBC # BLD AUTO: 5.28 10E6/UL (ref 4.4–5.9)
SODIUM SERPL-SCNC: 140 MMOL/L (ref 136–145)
TRIGL SERPL-MCNC: 477 MG/DL
WBC # BLD AUTO: 5.7 10E3/UL (ref 4–11)

## 2021-08-03 PROCEDURE — 80053 COMPREHEN METABOLIC PANEL: CPT | Performed by: FAMILY MEDICINE

## 2021-08-03 PROCEDURE — 99214 OFFICE O/P EST MOD 30 MIN: CPT | Mod: 25 | Performed by: FAMILY MEDICINE

## 2021-08-03 PROCEDURE — 80061 LIPID PANEL: CPT | Performed by: FAMILY MEDICINE

## 2021-08-03 PROCEDURE — 99395 PREV VISIT EST AGE 18-39: CPT | Performed by: FAMILY MEDICINE

## 2021-08-03 PROCEDURE — 82306 VITAMIN D 25 HYDROXY: CPT | Performed by: FAMILY MEDICINE

## 2021-08-03 PROCEDURE — 36415 COLL VENOUS BLD VENIPUNCTURE: CPT | Performed by: FAMILY MEDICINE

## 2021-08-03 PROCEDURE — 85025 COMPLETE CBC W/AUTO DIFF WBC: CPT | Performed by: FAMILY MEDICINE

## 2021-08-03 RX ORDER — CLOTRIMAZOLE AND BETAMETHASONE DIPROPIONATE 10; .64 MG/G; MG/G
CREAM TOPICAL 2 TIMES DAILY
Qty: 45 G | Refills: 1 | Status: SHIPPED | OUTPATIENT
Start: 2021-08-03

## 2021-08-03 ASSESSMENT — MIFFLIN-ST. JEOR: SCORE: 1622.42

## 2021-08-03 NOTE — ASSESSMENT & PLAN NOTE
"3 months  Red bumps >> rash   Dry   Shiny  \"scratch  Becomes like dead skin \"    No Tick Bites     DDX  Eczema / Fungus   "

## 2021-08-03 NOTE — PROGRESS NOTES
"Lumbar  ASSESSMENT & PLAN    Dermatitis  3 months  Red bumps >> rash   Dry   Shiny  \"scratch  Becomes like dead skin \"    No Tick Bites     DDX  Eczema / Fungus       Preston Cordova was seen today for physical.    Diagnoses and all orders for this visit:    Dermatitis  -     clotrimazole-betamethasone (LOTRISONE) 1-0.05 % external cream; Apply topically 2 times daily Apply twice daily to shoulder until resolved or up to 21 days    Well adult exam    Screening for lipid disorders  -     Lipid Profile (Chol, Trig, HDL, LDL calc); Future  -     Lipid Profile (Chol, Trig, HDL, LDL calc)    Screening for diabetes mellitus  -     Comprehensive metabolic panel (BMP + Alb, Alk Phos, ALT, AST, Total. Bili, TP); Future  -     Comprehensive metabolic panel (BMP + Alb, Alk Phos, ALT, AST, Total. Bili, TP)    Thrombocytopenia (H)  -     CBC with platelets differential; Future  -     CBC with platelets differential    Acute midline low back pain without sciatica  -     XR Lumbar Spine 2/3 Views; Future    Vitamin deficiency  -     Vitamin D Deficiency; Future  -     Vitamin D Deficiency        There are no Patient Instructions on file for this visit.    No follow-ups on file.       [unfilled]    CHIEF COMPLAINT: Preston Roger had concerns including Physical.    [place]: 1.............. SUBJECTIVE:  Preston Roger is a 30 year old male had concerns including Physical.    1. Dermatitis    2. Well adult exam    3. Screening for lipid disorders    4. Screening for diabetes mellitus    5. Thrombocytopenia (H)    6. Acute midline low back pain without sciatica    7. Vitamin deficiency          No Known Allergies                      SOCIAL: He  reports that he has been smoking. He has never used smokeless tobacco. He reports current alcohol use of about 5.0 standard drinks of alcohol per week. He reports that he does not use drugs.    REVIEW OF SYSTEMS:   Family history not pertinent to chief complaint or presenting problem    Review of systems " "otherwise negative as requested from patient, except   Those positive ROS outlined and discussed in [place].      VITALS:  Vitals:    08/03/21 1257 08/03/21 1410   BP: (!) 128/90 122/78   BP Location: Left arm    Patient Position: Sitting    Cuff Size: Adult Regular    Pulse: 81    SpO2: 98%    Weight: 71.9 kg (158 lb 7 oz)    Height: 1.67 m (5' 5.75\")      Wt Readings from Last 3 Encounters:   08/03/21 71.9 kg (158 lb 7 oz)   02/26/20 68 kg (150 lb)   05/08/19 64.9 kg (143 lb)     Body mass index is 25.77 kg/m .    Physical Exam:      Physical:  General Appearance: Healthy-appearingy.   Head:  No deformity  Eyes: Sclerae white, pupils equal and reactive, extra ocular movements intact   Ears: Well-positioned, well-formed pinnae; TM pearly white, translucent, no bulging   Nose: Clear, normal mucosa no drainage or crusting  Throat: Lips, tongue, and mucosa are moist, pink and intact; tongue no thrush oral pharynx no injection or lesions  Neck: Supple, symmetric ROM no nodes   No carotid Buits  Chest: Lungs clear to auscultation, no retractions  Heart: Regular rate & rhythm, S1 S2, no murmur  Abdomen: Soft, non-tender, no masses; umbilical area normal   Pulses: Equal femoral pulses  : No hernia palpable   Extremities: Well-perfused, warm and dry, No Edema  Palpable Pulses Bilateral  Neuro: Easily aroused good tone NO tremor   Skin shiny area of skin right shoulder clavicle area slightly hypopigmented shiny dry looking  No rolled border  No vesicles  No satellite lesions  Tender to palpation mid lumbar spine L1-L2 3 normal heel toe walk           I spent 35  minutes with this patient.  This includes pre-visit, intra-visit and post visit work an evaluation with regards to Preston Cordova was seen today for physical.    Diagnoses and all orders for this visit:    Dermatitis  -     clotrimazole-betamethasone (LOTRISONE) 1-0.05 % external cream; Apply topically 2 times daily Apply twice daily to shoulder until resolved or up to 21 " days    Well adult exam    Screening for lipid disorders  -     Lipid Profile (Chol, Trig, HDL, LDL calc); Future  -     Lipid Profile (Chol, Trig, HDL, LDL calc)    Screening for diabetes mellitus  -     Comprehensive metabolic panel (BMP + Alb, Alk Phos, ALT, AST, Total. Bili, TP); Future  -     Comprehensive metabolic panel (BMP + Alb, Alk Phos, ALT, AST, Total. Bili, TP)    Thrombocytopenia (H)  -     CBC with platelets differential; Future  -     CBC with platelets differential    Acute midline low back pain without sciatica  -     XR Lumbar Spine 2/3 Views; Future    Vitamin deficiency  -     Vitamin D Deficiency; Future  -     Vitamin D Deficiency        Magdi Nowak MD  Family Medicine   [facility] [address] [phone]

## 2021-08-03 NOTE — PATIENT INSTRUCTIONS
Rash area  on right shoulder   Lotrisone apply twice daily for 3 weeks     We are doing an x-ray of your spine today  If you would like you could consider seeing a chiropractor or physical therapist    Please do your Covid vaccine #1 today

## 2021-08-04 LAB — DEPRECATED CALCIDIOL+CALCIFEROL SERPL-MC: 15 UG/L (ref 30–80)

## 2021-08-05 ENCOUNTER — IMMUNIZATION (OUTPATIENT)
Dept: NURSING | Facility: CLINIC | Age: 31
End: 2021-08-05
Payer: COMMERCIAL

## 2021-08-05 PROCEDURE — 0001A PR COVID VAC PFIZER DIL RECON 30 MCG/0.3 ML IM: CPT | Performed by: FAMILY MEDICINE

## 2021-08-05 PROCEDURE — 91300 PR COVID VAC PFIZER DIL RECON 30 MCG/0.3 ML IM: CPT | Performed by: FAMILY MEDICINE

## 2021-08-06 DIAGNOSIS — E55.9 VITAMIN D DEFICIENCY: Primary | ICD-10-CM

## 2021-08-26 ENCOUNTER — IMMUNIZATION (OUTPATIENT)
Dept: NURSING | Facility: CLINIC | Age: 31
End: 2021-08-26
Attending: FAMILY MEDICINE
Payer: COMMERCIAL

## 2021-08-26 PROCEDURE — 0002A PR COVID VAC PFIZER DIL RECON 30 MCG/0.3 ML IM: CPT

## 2021-08-26 PROCEDURE — 91300 PR COVID VAC PFIZER DIL RECON 30 MCG/0.3 ML IM: CPT

## 2021-10-17 ENCOUNTER — HEALTH MAINTENANCE LETTER (OUTPATIENT)
Age: 31
End: 2021-10-17

## 2022-05-09 ENCOUNTER — HOSPITAL ENCOUNTER (OUTPATIENT)
Facility: HOSPITAL | Age: 32
Setting detail: OBSERVATION
Discharge: HOME | End: 2022-05-11
Attending: EMERGENCY MEDICINE | Admitting: EMERGENCY MEDICINE
Payer: COMMERCIAL

## 2022-05-09 ENCOUNTER — APPOINTMENT (OUTPATIENT)
Dept: RADIOLOGY | Facility: HOSPITAL | Age: 32
End: 2022-05-09
Attending: EMERGENCY MEDICINE
Payer: COMMERCIAL

## 2022-05-09 ENCOUNTER — APPOINTMENT (OUTPATIENT)
Dept: CT IMAGING | Facility: HOSPITAL | Age: 32
End: 2022-05-09
Attending: EMERGENCY MEDICINE
Payer: COMMERCIAL

## 2022-05-09 DIAGNOSIS — U07.1 INFECTION DUE TO 2019 NOVEL CORONAVIRUS: ICD-10-CM

## 2022-05-09 DIAGNOSIS — F17.200 TOBACCO DEPENDENCE SYNDROME: Primary | ICD-10-CM

## 2022-05-09 DIAGNOSIS — J38.7 EPIGLOTTIC CYST: ICD-10-CM

## 2022-05-09 LAB
ANION GAP SERPL CALCULATED.3IONS-SCNC: 16 MMOL/L (ref 5–18)
BUN SERPL-MCNC: 16 MG/DL (ref 8–22)
CALCIUM SERPL-MCNC: 9 MG/DL (ref 8.5–10.5)
CHLORIDE BLD-SCNC: 102 MMOL/L (ref 98–107)
CO2 SERPL-SCNC: 18 MMOL/L (ref 22–31)
CREAT SERPL-MCNC: 0.87 MG/DL (ref 0.7–1.3)
GFR SERPL CREATININE-BSD FRML MDRD: >90 ML/MIN/1.73M2
GLUCOSE BLD-MCNC: 108 MG/DL (ref 70–125)
POTASSIUM BLD-SCNC: 3.2 MMOL/L (ref 3.5–5)
SODIUM SERPL-SCNC: 136 MMOL/L (ref 136–145)

## 2022-05-09 PROCEDURE — C9803 HOPD COVID-19 SPEC COLLECT: HCPCS

## 2022-05-09 PROCEDURE — 86140 C-REACTIVE PROTEIN: CPT | Performed by: HOSPITALIST

## 2022-05-09 PROCEDURE — 250N000009 HC RX 250: Performed by: EMERGENCY MEDICINE

## 2022-05-09 PROCEDURE — 70491 CT SOFT TISSUE NECK W/DYE: CPT

## 2022-05-09 PROCEDURE — 82565 ASSAY OF CREATININE: CPT | Performed by: INTERNAL MEDICINE

## 2022-05-09 PROCEDURE — 36415 COLL VENOUS BLD VENIPUNCTURE: CPT | Performed by: EMERGENCY MEDICINE

## 2022-05-09 PROCEDURE — 99285 EMERGENCY DEPT VISIT HI MDM: CPT | Mod: 25

## 2022-05-09 PROCEDURE — 250N000013 HC RX MED GY IP 250 OP 250 PS 637: Performed by: EMERGENCY MEDICINE

## 2022-05-09 PROCEDURE — 80048 BASIC METABOLIC PNL TOTAL CA: CPT | Performed by: EMERGENCY MEDICINE

## 2022-05-09 PROCEDURE — 96374 THER/PROPH/DIAG INJ IV PUSH: CPT | Mod: 59

## 2022-05-09 PROCEDURE — 70360 X-RAY EXAM OF NECK: CPT

## 2022-05-09 PROCEDURE — 250N000011 HC RX IP 250 OP 636: Performed by: EMERGENCY MEDICINE

## 2022-05-09 RX ORDER — IOPAMIDOL 755 MG/ML
100 INJECTION, SOLUTION INTRAVASCULAR ONCE
Status: COMPLETED | OUTPATIENT
Start: 2022-05-10 | End: 2022-05-09

## 2022-05-09 RX ORDER — LIDOCAINE HYDROCHLORIDE 20 MG/ML
5 SOLUTION OROPHARYNGEAL ONCE
Status: COMPLETED | OUTPATIENT
Start: 2022-05-09 | End: 2022-05-09

## 2022-05-09 RX ORDER — IBUPROFEN 600 MG/1
600 TABLET, FILM COATED ORAL ONCE
Status: COMPLETED | OUTPATIENT
Start: 2022-05-09 | End: 2022-05-09

## 2022-05-09 RX ADMIN — IOPAMIDOL 100 ML: 755 INJECTION, SOLUTION INTRAVENOUS at 23:46

## 2022-05-09 RX ADMIN — IBUPROFEN 600 MG: 600 TABLET ORAL at 21:46

## 2022-05-09 RX ADMIN — LIDOCAINE HYDROCHLORIDE 5 ML: 20 SOLUTION ORAL; TOPICAL at 21:46

## 2022-05-10 VITALS
OXYGEN SATURATION: 97 % | RESPIRATION RATE: 16 BRPM | HEIGHT: 65 IN | HEART RATE: 69 BPM | TEMPERATURE: 98.7 F | BODY MASS INDEX: 25.83 KG/M2 | WEIGHT: 155 LBS | SYSTOLIC BLOOD PRESSURE: 135 MMHG | DIASTOLIC BLOOD PRESSURE: 85 MMHG

## 2022-05-10 PROBLEM — J38.7 EPIGLOTTIC CYST: Status: ACTIVE | Noted: 2022-05-10

## 2022-05-10 PROBLEM — J02.9 SORETHROAT: Status: ACTIVE | Noted: 2022-05-10

## 2022-05-10 PROBLEM — U07.1 INFECTION DUE TO 2019 NOVEL CORONAVIRUS: Status: ACTIVE | Noted: 2022-05-10

## 2022-05-10 LAB
ALBUMIN SERPL-MCNC: 4.1 G/DL (ref 3.5–5)
ALP SERPL-CCNC: 70 U/L (ref 45–120)
ALT SERPL W P-5'-P-CCNC: 44 U/L (ref 0–45)
ANION GAP SERPL CALCULATED.3IONS-SCNC: 5 MMOL/L (ref 5–18)
AST SERPL W P-5'-P-CCNC: 27 U/L (ref 0–40)
BASOPHILS # BLD AUTO: 0.1 10E3/UL (ref 0–0.2)
BASOPHILS # BLD AUTO: 0.1 10E3/UL (ref 0–0.2)
BASOPHILS NFR BLD AUTO: 1 %
BASOPHILS NFR BLD AUTO: 2 %
BILIRUB SERPL-MCNC: 1.3 MG/DL (ref 0–1)
BUN SERPL-MCNC: 13 MG/DL (ref 8–22)
C REACTIVE PROTEIN LHE: <0.1 MG/DL (ref 0–0.8)
C REACTIVE PROTEIN LHE: <0.1 MG/DL (ref 0–0.8)
CALCIUM SERPL-MCNC: 8.8 MG/DL (ref 8.5–10.5)
CHLORIDE BLD-SCNC: 105 MMOL/L (ref 98–107)
CO2 SERPL-SCNC: 26 MMOL/L (ref 22–31)
CREAT SERPL-MCNC: 0.7 MG/DL (ref 0.7–1.3)
CREAT SERPL-MCNC: 0.87 MG/DL (ref 0.7–1.3)
D DIMER PPP FEU-MCNC: 0.5 UG/ML FEU (ref 0–0.5)
EOSINOPHIL # BLD AUTO: 0.3 10E3/UL (ref 0–0.7)
EOSINOPHIL # BLD AUTO: 0.3 10E3/UL (ref 0–0.7)
EOSINOPHIL NFR BLD AUTO: 5 %
EOSINOPHIL NFR BLD AUTO: 6 %
ERYTHROCYTE [DISTWIDTH] IN BLOOD BY AUTOMATED COUNT: 13.2 % (ref 10–15)
ERYTHROCYTE [DISTWIDTH] IN BLOOD BY AUTOMATED COUNT: 13.2 % (ref 10–15)
GFR SERPL CREATININE-BSD FRML MDRD: >90 ML/MIN/1.73M2
GFR SERPL CREATININE-BSD FRML MDRD: >90 ML/MIN/1.73M2
GLUCOSE BLD-MCNC: 86 MG/DL (ref 70–125)
HCT VFR BLD AUTO: 42.4 % (ref 40–53)
HCT VFR BLD AUTO: 42.5 % (ref 40–53)
HGB BLD-MCNC: 13.9 G/DL (ref 13.3–17.7)
HGB BLD-MCNC: 14 G/DL (ref 13.3–17.7)
HOLD SPECIMEN: NORMAL
IMM GRANULOCYTES # BLD: 0 10E3/UL
IMM GRANULOCYTES # BLD: 0 10E3/UL
IMM GRANULOCYTES NFR BLD: 0 %
IMM GRANULOCYTES NFR BLD: 0 %
INR PPP: 1.11 (ref 0.9–1.15)
LYMPHOCYTES # BLD AUTO: 1.9 10E3/UL (ref 0.8–5.3)
LYMPHOCYTES # BLD AUTO: 2.3 10E3/UL (ref 0.8–5.3)
LYMPHOCYTES NFR BLD AUTO: 34 %
LYMPHOCYTES NFR BLD AUTO: 42 %
MCH RBC QN AUTO: 27.4 PG (ref 26.5–33)
MCH RBC QN AUTO: 27.9 PG (ref 26.5–33)
MCHC RBC AUTO-ENTMCNC: 32.8 G/DL (ref 31.5–36.5)
MCHC RBC AUTO-ENTMCNC: 32.9 G/DL (ref 31.5–36.5)
MCV RBC AUTO: 84 FL (ref 78–100)
MCV RBC AUTO: 85 FL (ref 78–100)
MONOCYTES # BLD AUTO: 0.5 10E3/UL (ref 0–1.3)
MONOCYTES # BLD AUTO: 0.5 10E3/UL (ref 0–1.3)
MONOCYTES NFR BLD AUTO: 8 %
MONOCYTES NFR BLD AUTO: 8 %
NEUTROPHILS # BLD AUTO: 2.4 10E3/UL (ref 1.6–8.3)
NEUTROPHILS # BLD AUTO: 2.9 10E3/UL (ref 1.6–8.3)
NEUTROPHILS NFR BLD AUTO: 42 %
NEUTROPHILS NFR BLD AUTO: 52 %
NRBC # BLD AUTO: 0 10E3/UL
NRBC # BLD AUTO: 0 10E3/UL
NRBC BLD AUTO-RTO: 0 /100
NRBC BLD AUTO-RTO: 0 /100
PLATELET # BLD AUTO: 147 10E3/UL (ref 150–450)
PLATELET # BLD AUTO: 152 10E3/UL (ref 150–450)
POTASSIUM BLD-SCNC: 4 MMOL/L (ref 3.5–5)
PROT SERPL-MCNC: 7 G/DL (ref 6–8)
RBC # BLD AUTO: 5.02 10E6/UL (ref 4.4–5.9)
RBC # BLD AUTO: 5.07 10E6/UL (ref 4.4–5.9)
SARS-COV-2 RNA RESP QL NAA+PROBE: POSITIVE
SODIUM SERPL-SCNC: 136 MMOL/L (ref 136–145)
WBC # BLD AUTO: 5.5 10E3/UL (ref 4–11)
WBC # BLD AUTO: 5.5 10E3/UL (ref 4–11)

## 2022-05-10 PROCEDURE — 87635 SARS-COV-2 COVID-19 AMP PRB: CPT | Performed by: EMERGENCY MEDICINE

## 2022-05-10 PROCEDURE — 99253 IP/OBS CNSLTJ NEW/EST LOW 45: CPT | Performed by: OTOLARYNGOLOGY

## 2022-05-10 PROCEDURE — 258N000003 HC RX IP 258 OP 636: Performed by: INTERNAL MEDICINE

## 2022-05-10 PROCEDURE — 258N000003 HC RX IP 258 OP 636: Performed by: HOSPITALIST

## 2022-05-10 PROCEDURE — 96372 THER/PROPH/DIAG INJ SC/IM: CPT | Performed by: INTERNAL MEDICINE

## 2022-05-10 PROCEDURE — 85025 COMPLETE CBC W/AUTO DIFF WBC: CPT | Performed by: EMERGENCY MEDICINE

## 2022-05-10 PROCEDURE — 250N000013 HC RX MED GY IP 250 OP 250 PS 637: Performed by: INTERNAL MEDICINE

## 2022-05-10 PROCEDURE — 85018 HEMOGLOBIN: CPT | Performed by: INTERNAL MEDICINE

## 2022-05-10 PROCEDURE — 80053 COMPREHEN METABOLIC PANEL: CPT | Performed by: INTERNAL MEDICINE

## 2022-05-10 PROCEDURE — 99219 PR INITIAL OBSERVATION CARE,LEVEL II: CPT | Performed by: INTERNAL MEDICINE

## 2022-05-10 PROCEDURE — 250N000011 HC RX IP 250 OP 636: Performed by: EMERGENCY MEDICINE

## 2022-05-10 PROCEDURE — 96374 THER/PROPH/DIAG INJ IV PUSH: CPT | Mod: 59

## 2022-05-10 PROCEDURE — 86140 C-REACTIVE PROTEIN: CPT | Performed by: INTERNAL MEDICINE

## 2022-05-10 PROCEDURE — 250N000013 HC RX MED GY IP 250 OP 250 PS 637: Performed by: HOSPITALIST

## 2022-05-10 PROCEDURE — 36415 COLL VENOUS BLD VENIPUNCTURE: CPT | Performed by: EMERGENCY MEDICINE

## 2022-05-10 PROCEDURE — 85379 FIBRIN DEGRADATION QUANT: CPT | Performed by: INTERNAL MEDICINE

## 2022-05-10 PROCEDURE — 85610 PROTHROMBIN TIME: CPT | Performed by: INTERNAL MEDICINE

## 2022-05-10 PROCEDURE — G0378 HOSPITAL OBSERVATION PER HR: HCPCS

## 2022-05-10 PROCEDURE — 96361 HYDRATE IV INFUSION ADD-ON: CPT

## 2022-05-10 PROCEDURE — 36415 COLL VENOUS BLD VENIPUNCTURE: CPT | Performed by: INTERNAL MEDICINE

## 2022-05-10 PROCEDURE — 85041 AUTOMATED RBC COUNT: CPT | Performed by: INTERNAL MEDICINE

## 2022-05-10 PROCEDURE — 250N000011 HC RX IP 250 OP 636: Performed by: INTERNAL MEDICINE

## 2022-05-10 RX ORDER — NICOTINE 21 MG/24HR
1 PATCH, TRANSDERMAL 24 HOURS TRANSDERMAL EVERY 24 HOURS
Status: DISCONTINUED | OUTPATIENT
Start: 2022-05-10 | End: 2022-05-12 | Stop reason: HOSPADM

## 2022-05-10 RX ORDER — ONDANSETRON 2 MG/ML
4 INJECTION INTRAMUSCULAR; INTRAVENOUS EVERY 6 HOURS PRN
Status: DISCONTINUED | OUTPATIENT
Start: 2022-05-10 | End: 2022-05-12 | Stop reason: HOSPADM

## 2022-05-10 RX ORDER — ENOXAPARIN SODIUM 100 MG/ML
40 INJECTION SUBCUTANEOUS EVERY 24 HOURS
Status: DISCONTINUED | OUTPATIENT
Start: 2022-05-10 | End: 2022-05-12 | Stop reason: HOSPADM

## 2022-05-10 RX ORDER — POTASSIUM CHLORIDE 1.5 G/1.58G
20 POWDER, FOR SOLUTION ORAL 2 TIMES DAILY
Status: COMPLETED | OUTPATIENT
Start: 2022-05-10 | End: 2022-05-10

## 2022-05-10 RX ORDER — KETOROLAC TROMETHAMINE 30 MG/ML
15 INJECTION, SOLUTION INTRAMUSCULAR; INTRAVENOUS ONCE
Status: COMPLETED | OUTPATIENT
Start: 2022-05-10 | End: 2022-05-10

## 2022-05-10 RX ORDER — SODIUM CHLORIDE, SODIUM LACTATE, POTASSIUM CHLORIDE, CALCIUM CHLORIDE 600; 310; 30; 20 MG/100ML; MG/100ML; MG/100ML; MG/100ML
INJECTION, SOLUTION INTRAVENOUS CONTINUOUS
Status: DISCONTINUED | OUTPATIENT
Start: 2022-05-10 | End: 2022-05-10 | Stop reason: ALTCHOICE

## 2022-05-10 RX ORDER — POLYETHYLENE GLYCOL 3350 17 G/17G
17 POWDER, FOR SOLUTION ORAL DAILY PRN
Status: DISCONTINUED | OUTPATIENT
Start: 2022-05-10 | End: 2022-05-12 | Stop reason: HOSPADM

## 2022-05-10 RX ORDER — LIDOCAINE 40 MG/G
CREAM TOPICAL
Status: DISCONTINUED | OUTPATIENT
Start: 2022-05-10 | End: 2022-05-12 | Stop reason: HOSPADM

## 2022-05-10 RX ORDER — SODIUM CHLORIDE, SODIUM LACTATE, POTASSIUM CHLORIDE, CALCIUM CHLORIDE 600; 310; 30; 20 MG/100ML; MG/100ML; MG/100ML; MG/100ML
INJECTION, SOLUTION INTRAVENOUS CONTINUOUS
Status: DISCONTINUED | OUTPATIENT
Start: 2022-05-10 | End: 2022-05-10

## 2022-05-10 RX ORDER — ACETAMINOPHEN 325 MG/1
650 TABLET ORAL EVERY 4 HOURS PRN
Status: DISCONTINUED | OUTPATIENT
Start: 2022-05-10 | End: 2022-05-12 | Stop reason: HOSPADM

## 2022-05-10 RX ADMIN — NICOTINE 1 PATCH: 14 PATCH, EXTENDED RELEASE TRANSDERMAL at 18:48

## 2022-05-10 RX ADMIN — KETOROLAC TROMETHAMINE 15 MG: 30 INJECTION, SOLUTION INTRAMUSCULAR at 01:55

## 2022-05-10 RX ADMIN — ENOXAPARIN SODIUM 40 MG: 40 INJECTION SUBCUTANEOUS at 18:46

## 2022-05-10 RX ADMIN — SODIUM CHLORIDE, POTASSIUM CHLORIDE, SODIUM LACTATE AND CALCIUM CHLORIDE: 600; 310; 30; 20 INJECTION, SOLUTION INTRAVENOUS at 18:50

## 2022-05-10 RX ADMIN — POTASSIUM CHLORIDE 20 MEQ: 1.5 POWDER, FOR SOLUTION ORAL at 12:06

## 2022-05-10 RX ADMIN — SODIUM CHLORIDE, POTASSIUM CHLORIDE, SODIUM LACTATE AND CALCIUM CHLORIDE: 600; 310; 30; 20 INJECTION, SOLUTION INTRAVENOUS at 05:27

## 2022-05-10 RX ADMIN — POTASSIUM CHLORIDE 20 MEQ: 1.5 POWDER, FOR SOLUTION ORAL at 09:18

## 2022-05-10 RX ADMIN — PHENOL 1 ML: 1.4 SPRAY ORAL at 12:22

## 2022-05-10 NOTE — CONSULTS
"CHIEF COMPLAINT: Throat Discomfort      HISTORY OF PRESENT ILLNESS    Preston was seen at the behest of Jie Concepcion MD for throat pain.   Patient is concerned that he may have swallowed a bone from some shrimp that he had several weeks ago.    ED visit note:    2115 Pt with one week ago sensation of irritation to throat while eating \"head bone\" of shrimp, he is tolerating PO since then, no fever, exam WNL. Will XR and ensure no pucture/air in mediastinum and no bony remnant, patient ok with plan. Viscous lidocaine PO ordered for throat soothing   2145 XR with thickened epiglottal appearance which may reflect swallowing during XR vs anomaly, CT recommended by radiology and ordered by me, pt updated and amenable to plan.   2223 Radiology tech called me to request change to CT neck with IV contrast, green top draw required, order changed   Tue May 10, 2022   0054 CT with 2 cysts anterior aspect of epiglottis which explain XR findings. ENT paged to discuss plan and follow up planning   0112 I spoke with Dr Awad from ENT re: multiple epiglottal cysts with discomfort/pain, he recommends observation with symtpomatic painful cysts as this is unusual finding and will see patient at [facility] while admitted, COVID19 PCR pending for admission     CT NECK with CONTRAST:    1.  2 cysts at the anterior aspect of epiglottis; as described. Subtle peripheral enhancement without evidence of nodularity.  2.  Findings are nonspecific, but most consistent with laryngeal/epiglottic cyst. An infectious process or neoplasm is less likely. Direct visualization recommended.       REVIEW OF SYSTEMS    Review of Systems as per HPI and PMHx, otherwise 10 system review system are negative.     Patient has no known allergies.     /75   Pulse 64   Temp 98.3  F (36.8  C) (Oral)   Resp 16   Ht 1.651 m (5' 5\")   Wt 70.3 kg (155 lb)   SpO2 97%   BMI 25.79 kg/m        Current Facility-Administered Medications:      acetaminophen (TYLENOL) " tablet 650 mg, 650 mg, Oral, Q4H PRN, Bharat Alba MD     benzocaine-menthol (CEPACOL) 15-3.6 MG lozenge 1 lozenge, 1 lozenge, Buccal, Q1H PRN, Ernst Garrison MD     enoxaparin ANTICOAGULANT (LOVENOX) injection 40 mg, 40 mg, Subcutaneous, Q24H, Ernst Garrison MD     lactated ringers infusion, , Intravenous, Continuous, Ernst Garrison MD     lidocaine (LMX4) cream, , Topical, Q1H PRN, Ernst Garrison MD     lidocaine 1 % 0.1-1 mL, 0.1-1 mL, Other, Q1H PRN, Ernst Garrison MD     Medication instructions: Do NOT use nebulized medications, , Does not apply, Continuous PRN, Ernst Garrison MD     melatonin tablet 1 mg, 1 mg, Oral, At Bedtime PRN, Ernst Garrison MD     nicotine (NICODERM CQ) 14 MG/24HR 24 hr patch 1 patch, 1 patch, Transdermal, Q24H, Ernst Garrison MD     nicotine Patch in Place, , Transdermal, Q8H, Ernst Garrison MD     ondansetron (ZOFRAN) injection 4 mg, 4 mg, Intravenous, Q6H PRN, Bharat Alba MD     phenol (CHLORASEPTIC) 1.4 % spray 1 mL, 1 spray, Mouth/Throat, Q1H PRN, Bharat Alba MD, 1 mL at 05/10/22 1222     polyethylene glycol (MIRALAX) Packet 17 g, 17 g, Oral, Daily PRN, Ernst Garrison MD     sodium chloride (PF) 0.9% PF flush 3 mL, 3 mL, Intracatheter, Q8H, Ernst Garrison MD     sodium chloride (PF) 0.9% PF flush 3 mL, 3 mL, Intracatheter, q1 min prn, Ernst Garrison MD    Current Outpatient Medications:      Vitamin D3 (CHOLECALCIFEROL) 125 MCG (5000 UT) tablet, Take 1 tablet (125 mcg) by mouth daily (Patient taking differently: Take 125 mcg by mouth every other day), Disp: 30 tablet, Rfl: 11     clotrimazole-betamethasone (LOTRISONE) 1-0.05 % external cream, Apply topically 2 times daily Apply twice daily to shoulder until resolved or up to 21 days (Patient not taking: Reported on 5/10/2022), Disp: 45 g, Rfl: 1    IMPRESSION:    1. Epiglottic Cysts  2. COVID positive        RECOMMENDATIONS:      Thanks I discussed the case with ENT colleague and also with the hospitalist.  Since the patient is COVID-positive I think and aerosolizing procedures such as laryngoscopy would not be recommended at this time.  We agreed that he could be safely examined as an outpatient once he is COVID-negative negative.  I think the epiglottic cysts are an incidental finding and I do not think they are related in any way to his symptoms.  Patient has been relatively asymptomatic has been asymptomatic overnight.  He is he has been afebrile overnight without any desaturations.  He is using Chloraseptic spray for sore throat which does soothe his throat.  We agreed to start him on clears and advance his diet as tolerated.  Plan on discharge tomorrow morning with ENT follow-up in 2 weeks.

## 2022-05-10 NOTE — ED TRIAGE NOTES
patient reports he thinks he still has 1 of 2 bones from a shrimp head he ate two weeks ago lodged in his throat. Patient states he can eat and rink but that it is irritating to do both.

## 2022-05-10 NOTE — ED PROVIDER NOTES
"EMERGENCY DEPARTMENT ENCOUNTER      NAME: Preston Roger  AGE: 31 year old male  YOB: 1990  MRN: [id number]  EVALUATION DATE & TIME: No admission date for patient encounter.    PCP: Anupam Collazo    ED PROVIDER: Jie Concepcion M.D.      Chief Complaint   Patient presents with     Swallowed Foreign Body         FINAL IMPRESSION:  1. Epiglottic cyst          ED COURSE & MEDICAL DECISION MAKING:    ED Course as of 05/10/22 0138   Mon May 09, 2022   2115 Pt with one week ago sensation of irritation to throat while eating \"head bone\" of shrimp, he is tolerating PO since then, no fever, exam WNL. Will XR and ensure no pucture/air in mediastinum and no bony remnant, patient ok with plan. Viscous lidocaine PO ordered for throat soothing   2145 XR with thickened epiglottal appearance which may reflect swallowing during XR vs anomaly, CT recommended by radiology and ordered by me, pt updated and amenable to plan.   2223 Radiology tech called me to request change to CT neck with IV contrast, green top draw required, order changed   Tue May 10, 2022   0054 CT with 2 cysts anterior aspect of epiglottis which explain XR findings. ENT paged to discuss plan and follow up planning   0112 I spoke with Dr Aawd from ENT re: multiple epiglottal cysts with discomfort/pain, he recommends observation with symtpomatic painful cysts as this is unusual finding and will see patient at [facility] while admitted, COVID19 PCR pending for admission     9:14 PM I met with the patient, obtained history, performed an initial exam, and discussed options and plan for diagnostics and treatment here in the ED.   9:46 PM  XR results were communicated with the patient. I updated with CT recommendation.   1:12 AM Discussed care with Dr. Awad, ENT specialist regarding the patient.   1:21 AM Results were communicated with the patient. Discussed plan for admission. Patient was agreeable with the plan.  1:37 AM Discussed care with Dr. Alba, hospitalist " "regarding the patient. They accept the patient for admission.         Pertinent Labs & Imaging studies reviewed. (See chart for details)    N95 worn  A face shield was worn also  COVID PPE      At the conclusion of the encounter I discussed the results of all of the tests and the disposition. The questions were answered. The patient or family acknowledged understanding and was agreeable with the care plan.     MEDICATIONS GIVEN IN THE EMERGENCY:  Medications   ketorolac (TORADOL) injection 15 mg (has no administration in time range)   lidocaine (viscous) (XYLOCAINE) 2 % solution 5 mL (5 mLs Mouth/Throat Given 5/9/22 2146)   ibuprofen (ADVIL/MOTRIN) tablet 600 mg (600 mg Oral Given 5/9/22 2146)   iopamidol (ISOVUE-370) solution 100 mL (100 mLs Intravenous Given 5/9/22 2346)       NEW PRESCRIPTIONS STARTED AT TODAY'S ER VISIT  New Prescriptions    No medications on file          =================================================================    HPI      Preston Roger is a 31 year old male with no contributory PMHx who presents to the ED today via walk in with swallowed foreign body.     Patient was eating shrimp last week when he ate the \"whole head\" and felt a bone get stuck in his mouth which he was able to take the bone out. Since last week, he has felt like something is stuck in the left mid-throat. He states that it hurts to swallow. At present, while resting his pain is 8/10 in severity. He has not yet vomited. No prior similar experience that feels the same. Otherwise no fever, vomiting, cough, shortness of breath, diarrhea. No other medical complaints at this time.     REVIEW OF SYSTEMS   All other systems reviewed and are negative except as noted above in HPI.    PAST MEDICAL HISTORY:  Past Medical History:   Diagnosis Date     Lumbar pain        PAST SURGICAL HISTORY:  History reviewed. No pertinent surgical history.    CURRENT MEDICATIONS:    clotrimazole-betamethasone (LOTRISONE) 1-0.05 % external " "cream  Vitamin D3 (CHOLECALCIFEROL) 125 MCG (5000 UT) tablet        ALLERGIES:  No Known Allergies    FAMILY HISTORY:  Family History   Problem Relation Age of Onset     Diabetes Mother      Chronic Obstructive Pulmonary Disease Father        SOCIAL HISTORY:   Social History     Socioeconomic History     Marital status:    Tobacco Use     Smoking status: Current Every Day Smoker     Smokeless tobacco: Never Used   Vaping Use     Vaping Use: Never used   Substance and Sexual Activity     Alcohol use: Yes     Alcohol/week: 5.0 standard drinks     Drug use: No     Sexual activity: Yes     Partners: Female     Birth control/protection: None       VITALS:  Patient Vitals for the past 24 hrs:   BP Temp Temp src Pulse Resp SpO2 Height Weight   05/09/22 2148 124/80 -- -- 82 18 98 % -- --   05/09/22 2021 132/86 99  F (37.2  C) Temporal 91 20 97 % 1.651 m (5' 5\") 70.3 kg (155 lb)       PHYSICAL EXAM    GENERAL: Awake, alert.  In no acute distress.   HEENT: Normocephalic, atraumatic.  Pupils equal, round and reactive.  Conjunctiva normal.  EOMI. swallow function intact to palpation, oral pharynx with no redness or lesions or tonsillar swelling, or exudates, or lingual elevation.  NECK: No stridor or apparent deformity.  PULMONARY: Symmetrical breath sounds without distress.  Lungs clear to auscultation bilaterally without wheezes, rhonchi or rales.  CARDIO: Regular rate and rhythm.  No significant murmur, rub or gallop.  Radial pulses strong and symmetrical.  ABDOMINAL: Abdomen soft, non-distended and non-tender to palpation.  No CVAT, no palpable hepatosplenomegaly.  EXTREMITIES: No lower extremity swelling or edema.    NEURO: Alert and oriented to person, place and time.  Cranial nerves grossly intact.  No focal motor deficit.  PSYCH: Normal mood and affect  SKIN: No rashes      LAB:  All pertinent labs reviewed and interpreted.  Results for orders placed or performed during the hospital encounter of 05/09/22   Neck " soft tissue XR    Impression    IMPRESSION: Edematous appearance of the epiglottis. No hyperexpansion of the airway. No plain film evidence of a foreign body. No prevertebral edema. Consider further assessment with CT.   Soft tissue neck CT w contrast    Impression    IMPRESSION:   1.  2 cysts at the anterior aspect of epiglottis; as described. Subtle peripheral enhancement without evidence of nodularity.  2.  Findings are nonspecific, but most consistent with laryngeal/epiglottic cyst. An infectious process or neoplasm is less likely. Direct visualization recommended.   Basic metabolic panel   Result Value Ref Range    Sodium 136 136 - 145 mmol/L    Potassium 3.2 (L) 3.5 - 5.0 mmol/L    Chloride 102 98 - 107 mmol/L    Carbon Dioxide (CO2) 18 (L) 22 - 31 mmol/L    Anion Gap 16 5 - 18 mmol/L    Urea Nitrogen 16 8 - 22 mg/dL    Creatinine 0.87 0.70 - 1.30 mg/dL    Calcium 9.0 8.5 - 10.5 mg/dL    Glucose 108 70 - 125 mg/dL    GFR Estimate >90 >60 mL/min/1.73m2   CBC with platelets and differential   Result Value Ref Range    WBC Count 5.5 4.0 - 11.0 10e3/uL    RBC Count 5.02 4.40 - 5.90 10e6/uL    Hemoglobin 14.0 13.3 - 17.7 g/dL    Hematocrit 42.5 40.0 - 53.0 %    MCV 85 78 - 100 fL    MCH 27.9 26.5 - 33.0 pg    MCHC 32.9 31.5 - 36.5 g/dL    RDW 13.2 10.0 - 15.0 %    Platelet Count 152 150 - 450 10e3/uL    % Neutrophils 42 %    % Lymphocytes 42 %    % Monocytes 8 %    % Eosinophils 6 %    % Basophils 2 %    % Immature Granulocytes 0 %    NRBCs per 100 WBC 0 <1 /100    Absolute Neutrophils 2.4 1.6 - 8.3 10e3/uL    Absolute Lymphocytes 2.3 0.8 - 5.3 10e3/uL    Absolute Monocytes 0.5 0.0 - 1.3 10e3/uL    Absolute Eosinophils 0.3 0.0 - 0.7 10e3/uL    Absolute Basophils 0.1 0.0 - 0.2 10e3/uL    Absolute Immature Granulocytes 0.0 <=0.4 10e3/uL    Absolute NRBCs 0.0 10e3/uL       RADIOLOGY:  Reviewed all pertinent imaging. Please see official radiology report.  Soft tissue neck CT w contrast   Final Result   IMPRESSION:     1.  2 cysts at the anterior aspect of epiglottis; as described. Subtle peripheral enhancement without evidence of nodularity.   2.  Findings are nonspecific, but most consistent with laryngeal/epiglottic cyst. An infectious process or neoplasm is less likely. Direct visualization recommended.      Neck soft tissue XR   Final Result   IMPRESSION: Edematous appearance of the epiglottis. No hyperexpansion of the airway. No plain film evidence of a foreign body. No prevertebral edema. Consider further assessment with CT.        I, Dinorah Roger, am serving as a scribe to document services personally performed by Dr. Jie Concepcion based on my observation and the provider's statements to me. IJie MD attest that Dinorah Roger is acting in a scribe capacity, has observed my performance of the services and has documented them in accordance with my direction.     Jie Concepcion MD  05/10/22 0138

## 2022-05-10 NOTE — H&P
[facility]    History and Physical - Hospitalist Service       Date of Admission:  5/9/2022    Assessment & Plan      Preston Roger is a 31 year old male admitted on 5/9/2022 due to sore throat. Rapid COVID-19 testing done in the ER was positive. Neck soft tissue CT scan showed 2 cysts at the anterior aspect of epiglottis with subtle peripheral enhancement consistent with laryngeal/epiglottic cyst.  As per ENT specialist, Dr. Awad, sore throat is likely related to COVID infection and not epiglottic cyst.  ENT specialist recommended clear liquid diet with plan to advance diet as tolerated, observe till tomorrow and follow-up at the ENT clinic in the next 2 weeks for possible laryngoscopy.     Sore throat  Possibly secondary to COVID-19 infection; less likely epiglottic cyst as per ENT surgeon  Neck soft tissue CT scan showed 2 cysts at the anterior aspect of epiglottis with subtle peripheral enhancement consistent with laryngeal/epiglottic cyst.     Clear liquid diet for now; advance diet as tolerated  Cepacol as needed  Follow-up at the ENT clinic in the next 2 weeks for laryngoscopy as arranged    COVID-19 infection  Symptoms started about 1 week ago  Tested positive on 5/10/2022  Isolation precaution  Breathing comfortably on room air  Not requiring treatment for COVID-19 infection  Enoxaparin for DVT prophylaxis  Monitor inflammatory markers and oxygen saturation  Consider starting remdesivir if patient becomes hypoxic  Supplemental oxygen as needed      Hypokalemia  Replaced with potassium chloride  Monitor potassium level           Diet: Clear Liquid Diet    DVT Prophylaxis: Enoxaparin (Lovenox) SQ  Godfrey Catheter: Not present  Central Lines: None  Cardiac Monitoring: None  Code Status: Full Code    Clinically Significant Risk Factors Present on Admission                  # Overweight: Estimated body mass index is 25.79 kg/m  as calculated from the following:    Height as of this  "encounter: 1.651 m (5' 5\").    Weight as of this encounter: 70.3 kg (155 lb).      Disposition Plan   Expected Discharge:  to be discharged tomorrow 5/11/2022 if there are no new symptoms  Anticipated discharge location:  Awaiting care coordination huddle  Delays:    Observation on clear liquid diet as recommended by ENT surgeon.  To be discharged tomorrow 5/11/2022 if tolerating oral intake.       The patient's care was discussed with the Bedside Nurse, Patient and Dr. Awad from ENT surgery service..    Ernst Garrison MD  Hospitalist Service  [facility]  Securely message with the Vocera Web Console (learn more here)  Text page via [employer] Paging/Directory         ______________________________________________________________________    Chief Complaint   Sore throat    History is obtained from the patient    History of Present Illness   Preston Roger is a 31 year old male with no known significant medical issues who presented to the ER due to sore throat.    He he was in his usual state of health until about 1 week ago when he developed sore throat.  He attributes sore throat to irritation from \"head of shrimp\" that got stuck in his throat while eating about a week ago.  He denies fever, chills, nausea, vomiting, diarrhea, abdominal pain, cough, shortness of breath, dysgeusia, or nasal congestion.  He denies sick contacts or recent travel.    Rapid COVID-19 testing done in the ER was positive. Neck soft tissue CT scan showed 2 cysts at the anterior aspect of epiglottis with subtle peripheral enhancement consistent with laryngeal/epiglottic cyst.  As per ENT specialist, Dr. Awad, sore throat is likely related to COVID infection and not epiglottic cyst.  ENT specialist recommended clear liquid diet with plan to advance diet as tolerated, observe till tomorrow and follow-up at the ENT clinic in the next 2 weeks.     He received ibuprofen 600 mg p.o., IV ketorolac 15 mg, viscous lidocaine 5 " mL, and potassium chloride supplement in the ER.  He wants to be full code.    Review of Systems    The 10 point Review of Systems is negative other than noted in the HPI or here.     Past Medical History    I have reviewed this patient's medical history and updated it with pertinent information if needed.   Past Medical History:   Diagnosis Date     Lumbar pain        Past Surgical History   I have reviewed this patient's surgical history and updated it with pertinent information if needed.  History reviewed. No pertinent surgical history.    Social History   I have reviewed this patient's social history and updated it with pertinent information if needed.  Social History     Tobacco Use     Smoking status: Current Every Day Smoker     Smokeless tobacco: Never Used   Vaping Use     Vaping Use: Never used   Substance Use Topics     Alcohol use: Yes     Alcohol/week: 5.0 standard drinks     Drug use: No       Family History   I have reviewed this patient's family history and updated it with pertinent information if needed.  Family History   Problem Relation Age of Onset     Diabetes Mother      Chronic Obstructive Pulmonary Disease Father        Prior to Admission Medications   Prior to Admission Medications   Prescriptions Last Dose Informant Patient Reported? Taking?   Vitamin D3 (CHOLECALCIFEROL) 125 MCG (5000 UT) tablet 5/9/2022 at am  No Yes   Sig: Take 1 tablet (125 mcg) by mouth daily   Patient taking differently: Take 125 mcg by mouth every other day   clotrimazole-betamethasone (LOTRISONE) 1-0.05 % external cream Not Taking at Unknown time  No No   Sig: Apply topically 2 times daily Apply twice daily to shoulder until resolved or up to 21 days   Patient not taking: Reported on 5/10/2022      Facility-Administered Medications: None     Allergies   No Known Allergies    Physical Exam   Vital Signs: Temp: 98.7  F (37.1  C) Temp src: Oral BP: 135/85 Pulse: 69   Resp: 16 SpO2: 97 % O2 Device: None (Room air)     Weight: 155 lbs 0 oz    Physical Exam  General appearance: Awake, Alert, Cooperative, not in any obvious distress and appears stated age   HEENT: Normocephalic, atraumatic, conjunctiva clear without icterus and ears without discharge  Lungs: Normal work of breathing  Cardiovascular: Regular Rate and Rythm, normal apical impulse, normal S1 and S2, no lower extremity edema bilaterally  Abdomen: Soft, non-tender and Non-distended, active bowel sounds  Skin: Skin color, texture normal and bruising or bleeding. No rashes or lesions over face, neck, arms and legs, turgor normal.  Musculoskeletal: No bony deformities or joint tenderness. Normal ROM upon flexion & extension.   Neurologic: Alert & Oriented X 3, Facial symmetry preserved and upper & lower extremities moving well with symmetry  Psychiatric: Calm, normal eye contact and normal affect      Data   Data reviewed today: I reviewed all medications, new labs and imaging results over the last 24 hours. I personally reviewed the Neck soft tissue CT scan image(s) showing Neck soft tissue CT scan showed 2 cysts at the anterior aspect of epiglottis with subtle peripheral enhancement consistent with laryngeal/epiglottic cyst..    Recent Results (from the past 24 hour(s))   Neck soft tissue XR    Narrative    EXAM: XR NECK SOFT TISSUE  LOCATION: [facility]  DATE/TIME: 5/9/2022 9:19 PM    INDICATION: Throat irritation after eating the head of a shrimp. No fever.  COMPARISON: None.  TECHNIQUE: CR Neck Soft Tissue.      Impression    IMPRESSION: Edematous appearance of the epiglottis. No hyperexpansion of the airway. No plain film evidence of a foreign body. No prevertebral edema. Consider further assessment with CT.   Soft tissue neck CT w contrast    Narrative    EXAM: CT SOFT TISSUE NECK W CONTRAST  LOCATION: [facility]  DATE/TIME: 5/9/2022 11:45 PM    INDICATION: Abnormal finding prior exam, Epiglottis thickness on plain  radiographs.  COMPARISON: Plain radiographs 05/09/2022  CONTRAST: ISOVUE 370 100ML  TECHNIQUE: Routine CT Soft Tissue Neck with IV contrast. Multiplanar reformats. Dose reduction techniques were used.    FINDINGS:     MUCOSAL SPACES/SOFT TISSUES: Normal mucosal spaces of the upper aerodigestive tract. No mucosal mass or inflammation identified. Normal vocal cords and infraglottic trachea. Normal parapharyngeal space and subcutaneous soft tissues. Normal oral cavity,    spaces, and floor of mouth structures. 2 cysts at the anterior aspect of epiglottis. The larger on the left measures 10 x 10 x 13 mm. The smaller on the right measures 8 x 6 x 10 mm. Subtle peripheral enhancement without evidence of   nodularity.    LYMPH NODES: No pathologic lymph nodes by size or morphology criteria.     SALIVARY GLANDS: Normal parotid and submandibular glands.    THYROID: Normal.     VESSELS: Vascular structures of the neck are patent.    VISUALIZED INTRACRANIAL/ORBITS/SINUSES: No abnormality of the visualized intracranial compartment or orbits. Visualized paranasal sinuses and mastoid air cells are clear.    OTHER: No destructive osseous lesion. The included lung apices are clear.      Impression    IMPRESSION:   1.  2 cysts at the anterior aspect of epiglottis; as described. Subtle peripheral enhancement without evidence of nodularity.  2.  Findings are nonspecific, but most consistent with laryngeal/epiglottic cyst. An infectious process or neoplasm is less likely. Direct visualization recommended.

## 2022-05-10 NOTE — PHARMACY-ADMISSION MEDICATION HISTORY
Pharmacy Note - Admission Medication History     ______________________________________________________________________    Prior To Admission (PTA) med list completed and updated in EMR.       PTA Med List   Medication Sig Last Dose     Vitamin D3 (CHOLECALCIFEROL) 125 MCG (5000 UT) tablet Take 1 tablet (125 mcg) by mouth daily (Patient taking differently: Take 125 mcg by mouth every other day) 5/9/2022 at am       Information source(s): Patient, Clinic records and [facility]  Method of interview communication: in-person    Summary of Changes to PTA Med List  New: nothing   Discontinued: lotrisone cream  Changed: vit d frequency    Patient was asked about OTC/herbal products specifically.  PTA med list reflects this.    In the past week, patient estimated taking medication this percent of the time:  greater than 90%.    Allergies were reviewed, assessed, and updated with the patient.      Patient does not use any multi-dose medications prior to admission.    The information provided in this note is only as accurate as the sources available at the time of the update(s).    Thank you for the opportunity to participate in the care of this patient.    Jon Rosario RPH  5/10/2022 7:13 AM

## 2022-05-11 ENCOUNTER — APPOINTMENT (OUTPATIENT)
Dept: INTERPRETER SERVICES | Facility: CLINIC | Age: 32
End: 2022-05-11
Payer: COMMERCIAL

## 2022-05-11 LAB
ANION GAP SERPL CALCULATED.3IONS-SCNC: 9 MMOL/L (ref 5–18)
BUN SERPL-MCNC: 10 MG/DL (ref 8–22)
C REACTIVE PROTEIN LHE: <0.1 MG/DL (ref 0–0.8)
CALCIUM SERPL-MCNC: 9.2 MG/DL (ref 8.5–10.5)
CHLORIDE BLD-SCNC: 103 MMOL/L (ref 98–107)
CO2 SERPL-SCNC: 25 MMOL/L (ref 22–31)
CREAT SERPL-MCNC: 0.75 MG/DL (ref 0.7–1.3)
D DIMER PPP FEU-MCNC: 0.54 UG/ML FEU (ref 0–0.5)
ERYTHROCYTE [DISTWIDTH] IN BLOOD BY AUTOMATED COUNT: 13 % (ref 10–15)
GFR SERPL CREATININE-BSD FRML MDRD: >90 ML/MIN/1.73M2
GLUCOSE BLD-MCNC: 106 MG/DL (ref 70–125)
HCT VFR BLD AUTO: 45.3 % (ref 40–53)
HGB BLD-MCNC: 14.7 G/DL (ref 13.3–17.7)
MCH RBC QN AUTO: 27.3 PG (ref 26.5–33)
MCHC RBC AUTO-ENTMCNC: 32.5 G/DL (ref 31.5–36.5)
MCV RBC AUTO: 84 FL (ref 78–100)
PLATELET # BLD AUTO: 150 10E3/UL (ref 150–450)
POTASSIUM BLD-SCNC: 4.1 MMOL/L (ref 3.5–5)
RBC # BLD AUTO: 5.38 10E6/UL (ref 4.4–5.9)
SODIUM SERPL-SCNC: 137 MMOL/L (ref 136–145)
WBC # BLD AUTO: 5.3 10E3/UL (ref 4–11)

## 2022-05-11 PROCEDURE — 85014 HEMATOCRIT: CPT | Performed by: INTERNAL MEDICINE

## 2022-05-11 PROCEDURE — 85379 FIBRIN DEGRADATION QUANT: CPT | Performed by: HOSPITALIST

## 2022-05-11 PROCEDURE — 82310 ASSAY OF CALCIUM: CPT | Performed by: INTERNAL MEDICINE

## 2022-05-11 PROCEDURE — G0378 HOSPITAL OBSERVATION PER HR: HCPCS

## 2022-05-11 PROCEDURE — 999N000032 HC STATISTIC CHRONIC DISEASE SPECIALIST RT CONSULT

## 2022-05-11 PROCEDURE — 86140 C-REACTIVE PROTEIN: CPT | Performed by: INTERNAL MEDICINE

## 2022-05-11 PROCEDURE — 99217 PR OBSERVATION CARE DISCHARGE: CPT | Performed by: INTERNAL MEDICINE

## 2022-05-11 PROCEDURE — 36415 COLL VENOUS BLD VENIPUNCTURE: CPT | Performed by: INTERNAL MEDICINE

## 2022-05-11 PROCEDURE — 250N000013 HC RX MED GY IP 250 OP 250 PS 637: Performed by: INTERNAL MEDICINE

## 2022-05-11 RX ORDER — ACETAMINOPHEN 325 MG/1
650 TABLET ORAL EVERY 4 HOURS PRN
Qty: 30 TABLET | Refills: 1 | Status: SHIPPED | OUTPATIENT
Start: 2022-05-11

## 2022-05-11 RX ORDER — ACETAMINOPHEN 325 MG/1
650 TABLET ORAL EVERY 4 HOURS PRN
Qty: 30 TABLET | Refills: 1 | Status: SHIPPED | OUTPATIENT
Start: 2022-05-11 | End: 2022-05-11

## 2022-05-11 RX ORDER — ACETAMINOPHEN 325 MG/1
650 TABLET ORAL EVERY 4 HOURS PRN
Qty: 30 TABLET | Refills: 0 | Status: SHIPPED | OUTPATIENT
Start: 2022-05-11 | End: 2022-05-11

## 2022-05-11 RX ORDER — NICOTINE 21 MG/24HR
1 PATCH, TRANSDERMAL 24 HOURS TRANSDERMAL EVERY 24 HOURS
Qty: 7 PATCH | Refills: 0 | Status: SHIPPED | OUTPATIENT
Start: 2022-05-11

## 2022-05-11 RX ORDER — NICOTINE 21 MG/24HR
1 PATCH, TRANSDERMAL 24 HOURS TRANSDERMAL EVERY 24 HOURS
Qty: 7 PATCH | Refills: 0 | Status: SHIPPED | OUTPATIENT
Start: 2022-05-11 | End: 2022-05-11

## 2022-05-11 RX ADMIN — NICOTINE 1 PATCH: 14 PATCH, EXTENDED RELEASE TRANSDERMAL at 08:38

## 2022-05-11 NOTE — DISCHARGE INSTRUCTIONS
Call to schedule ENT appointment.  Dr Kody Awad consulted while in hospital.    Otolaryngology Ear/Nose/Throat    Otolaryngology Ear/Nose/Throat   [facility]  [address]  [place] [address]    Phone: [phone]

## 2022-05-11 NOTE — PROGRESS NOTES
Patient remains in stable condition  All meds given as ordered  Patient denies any pain, discomfort at this time  IV fluids completed; pt on contact and airborne precautions for COVID infection.  Patient currently resting in his bed;   Will continue with current plan of care

## 2022-05-11 NOTE — CONSULTS
Tobacco Treatment Consult  5/11/2022, 4:13 PM    Patient admitted on: 5/9/2022   Patient admitted for: Epiglottic cyst [J38.7]     Preston is currently observation status and COVID positive, due to this our service will not see him at this time Recommend referral to [facility] for smoking cessation at this time.    Total 15 minutes spent in chart review, care coordination, and documentation.    Digna Villanueva, RT, Chronic Pulmonary Disease Specialist & Tobacco Treatment Specialist  Phone [phone]

## 2022-05-11 NOTE — DISCHARGE SUMMARY
[facility]  Hospitalist Discharge Summary      Date of Admission:  5/9/2022  Date of Discharge:  5/11/2022  Discharging Provider: Dasha Lopez MD  Discharge Service: Hospitalist Service    Discharge Diagnoses   Principal Problem:    Sorethroat  Active Problems:    Epiglottic cyst    Infection due to 2019 novel coronavirus        Follow-ups Needed After Discharge   Follow-up Appointments     Follow-up and recommended labs and tests       Follow up with primary care provider, Anupam Collazo, within 3-5, to   evaluate medication change and for hospital follow- up. The following   labs/tests are recommended: CBC, CMP.    ENT in 2 week per note, nurse to contact ENT clinic and confirm   appointment date             Unresulted Labs Ordered in the Past 30 Days of this Admission     No orders found from 4/9/2022 to 5/10/2022.          Discharge Disposition   Discharged to home  Condition at discharge: Stable      Hospital Course   Preston Roger is a 31 year old male admitted on 5/9/2022 due to sore throat. Rapid COVID-19 testing done in the ER was positive. Neck soft tissue CT scan showed 2 cysts at the anterior aspect of epiglottis with subtle peripheral enhancement consistent with laryngeal/epiglottic cyst.  As per ENT specialist, Dr. Awad, sore throat is likely related to COVID infection and not epiglottic cyst.  ENT specialist recommended clear liquid diet with plan to advance diet as tolerated,  Interpretor used over the phone prior to discharging   Sore throat  Neck soft tissue CT scan showed 2 cysts at the anterior aspect of epiglottis with subtle peripheral enhancement consistent with laryngeal/epiglottic cyst.   - ENT seen and feel cysts ar an incidental finding   - F/U ENT in 2 weeks once off Covid isolation   -      COVID-19 infection  Tested positive on 5/10/2022  Isolation precaution  Breathing comfortably on room air  Not requiring treatment for COVID-19 infection  ASX, received  two vaccines not boostered  - not high risk  - discussed with ID not paxlovid or remdesivir at this time       Hypokalemia  - replaced  - recheck at PCP     Consultations This Hospital Stay   ENT IP CONSULT  SMOKING CESSATION PROGRAM IP CONSULT    Code Status   Full Code    Time Spent on this Encounter          Dasha Lopez MD  [facility] EMERGENCY DEPARTMENT  [address] [address]  Phone: [phone]  ______________________________________________________________________    Physical Exam   Vital Signs: Temp: 98.7  F (37.1  C) Temp src: Oral BP: 135/85 Pulse: 69     SpO2: 97 %      Weight: 155 lbs 0 oz  Patient left prior to seeing.        Primary Care Physician   Anupam Collazo    Discharge Orders      COVID-19 GetWell Loop Referral      Reason for your hospital stay    Principal Problem:    Sorethroat  Active Problems:    Epiglottic cyst    Infection due to 2019 novel coronavirus     Contact provider    Contact your primary care provider if If increased trouble breathing, new arm/leg swelling, dizziness/passing out, falls, bleeding that doesn't stop, or uncontrolled pain.     Follow-up and recommended labs and tests     Follow up with primary care provider, Anupam Collazo, within 3-5, to evaluate medication change and for hospital follow- up. The following labs/tests are recommended: CBC, CMP.    ENT in 2 week per note, nurse to contact ENT clinic and confirm appointment date     Discharge - Quarantine/Isolation Instruction    Date of symptom onset:     Date of first positive test:    If you tested positive COVID-19 and show symptoms (fever, cough, body aches or trouble breathing):        Stay home and away from others (self-isolate) until:        At least 10 days have passed since your symptoms started. AND...        You've had no fever-and no medicine that reduces fever for 1 full day (24 hours). AND...         Your other symptoms have resolved (gotten  better).  If you tested positive for COVID-19 but don't show symptoms:       Stay home and away from others (self-isolate) until at least 10 days have passed since the date of your first positive COVID-19 test.     Activity    Your activity upon discharge: activity as tolerated     Diet    Follow this diet upon discharge: Orders Placed This Encounter      Easy to Chew Diet (level 7)       Significant Results and Procedures   Most Recent 3 CBC's:  Recent Labs   Lab Test 05/10/22  1601 05/10/22  0130 08/03/21  1446   WBC 5.5 5.5 5.7   HGB 13.9 14.0 14.3   MCV 84 85 84   * 152 179     Most Recent 3 BMP's:  Recent Labs   Lab Test 05/10/22  1601 05/09/22  2248 08/03/21  1446    136 140   POTASSIUM 4.0 3.2* 4.2   CHLORIDE 105 102 104   CO2 26 18* 26   BUN 13 16 15   CR 0.70 0.87  0.87 0.68*   ANIONGAP 5 16 10   JOHN 8.8 9.0 9.7   GLC 86 108 93     Most Recent 2 LFT's:  Recent Labs   Lab Test 05/10/22  1601 08/03/21  1446   AST 27 31   ALT 44 47*   ALKPHOS 70 95   BILITOTAL 1.3* 0.6   ,   Results for orders placed or performed during the hospital encounter of 05/09/22   Neck soft tissue XR    Narrative    EXAM: XR NECK SOFT TISSUE  LOCATION: [facility]  DATE/TIME: 5/9/2022 9:19 PM    INDICATION: Throat irritation after eating the head of a shrimp. No fever.  COMPARISON: None.  TECHNIQUE: CR Neck Soft Tissue.      Impression    IMPRESSION: Edematous appearance of the epiglottis. No hyperexpansion of the airway. No plain film evidence of a foreign body. No prevertebral edema. Consider further assessment with CT.   Soft tissue neck CT w contrast    Narrative    EXAM: CT SOFT TISSUE NECK W CONTRAST  LOCATION: [facility]  DATE/TIME: 5/9/2022 11:45 PM    INDICATION: Abnormal finding prior exam, Epiglottis thickness on plain radiographs.  COMPARISON: Plain radiographs 05/09/2022  CONTRAST: ISOVUE 370 100ML  TECHNIQUE: Routine CT Soft Tissue Neck with IV contrast.  Multiplanar reformats. Dose reduction techniques were used.    FINDINGS:     MUCOSAL SPACES/SOFT TISSUES: Normal mucosal spaces of the upper aerodigestive tract. No mucosal mass or inflammation identified. Normal vocal cords and infraglottic trachea. Normal parapharyngeal space and subcutaneous soft tissues. Normal oral cavity,    spaces, and floor of mouth structures. 2 cysts at the anterior aspect of epiglottis. The larger on the left measures 10 x 10 x 13 mm. The smaller on the right measures 8 x 6 x 10 mm. Subtle peripheral enhancement without evidence of   nodularity.    LYMPH NODES: No pathologic lymph nodes by size or morphology criteria.     SALIVARY GLANDS: Normal parotid and submandibular glands.    THYROID: Normal.     VESSELS: Vascular structures of the neck are patent.    VISUALIZED INTRACRANIAL/ORBITS/SINUSES: No abnormality of the visualized intracranial compartment or orbits. Visualized paranasal sinuses and mastoid air cells are clear.    OTHER: No destructive osseous lesion. The included lung apices are clear.      Impression    IMPRESSION:   1.  2 cysts at the anterior aspect of epiglottis; as described. Subtle peripheral enhancement without evidence of nodularity.  2.  Findings are nonspecific, but most consistent with laryngeal/epiglottic cyst. An infectious process or neoplasm is less likely. Direct visualization recommended.       Discharge Medications   Current Discharge Medication List      START taking these medications    Details   acetaminophen (TYLENOL) 325 MG tablet Take 2 tablets (650 mg) by mouth every 4 hours as needed for mild pain, fever or headaches  Qty: 30 tablet, Refills: 1    Associated Diagnoses: Epiglottic cyst      nicotine (NICODERM CQ) 14 MG/24HR 24 hr patch Place 1 patch onto the skin every 24 hours  Qty: 7 patch, Refills: 0    Associated Diagnoses: Tobacco dependence syndrome      phenol (CHLORASEPTIC) 1.4 % spray Take 1 spray (1 mL) by mouth every hour as  needed for sore throat  Qty: 20 mL, Refills: 1    Associated Diagnoses: Epiglottic cyst         CONTINUE these medications which have NOT CHANGED    Details   Vitamin D3 (CHOLECALCIFEROL) 125 MCG (5000 UT) tablet Take 1 tablet (125 mcg) by mouth daily  Qty: 30 tablet, Refills: 11    Associated Diagnoses: Vitamin D deficiency      clotrimazole-betamethasone (LOTRISONE) 1-0.05 % external cream Apply topically 2 times daily Apply twice daily to shoulder until resolved or up to 21 days  Qty: 45 g, Refills: 1    Associated Diagnoses: Dermatitis           Allergies   No Known Allergies

## 2022-05-12 ENCOUNTER — APPOINTMENT (OUTPATIENT)
Dept: INTERPRETER SERVICES | Facility: CLINIC | Age: 32
End: 2022-05-12
Payer: COMMERCIAL

## 2022-06-07 ENCOUNTER — APPOINTMENT (OUTPATIENT)
Dept: INTERPRETER SERVICES | Facility: CLINIC | Age: 32
End: 2022-06-07
Payer: COMMERCIAL

## 2022-06-10 ENCOUNTER — OFFICE VISIT (OUTPATIENT)
Dept: OTOLARYNGOLOGY | Facility: CLINIC | Age: 32
End: 2022-06-10
Payer: COMMERCIAL

## 2022-06-10 DIAGNOSIS — R09.A2 GLOBUS SENSATION: Primary | ICD-10-CM

## 2022-06-10 DIAGNOSIS — J38.7 EPIGLOTTIC CYST: ICD-10-CM

## 2022-06-10 PROCEDURE — 99213 OFFICE O/P EST LOW 20 MIN: CPT | Mod: 25 | Performed by: OTOLARYNGOLOGY

## 2022-06-10 PROCEDURE — 31575 DIAGNOSTIC LARYNGOSCOPY: CPT | Performed by: OTOLARYNGOLOGY

## 2022-06-10 RX ORDER — CHOLECALCIFEROL (VITAMIN D3) 125 MCG
CAPSULE ORAL
COMMUNITY
Start: 2021-08-06

## 2022-06-10 NOTE — PROGRESS NOTES
CHIEF COMPLAINT:        HISTORY OF PRESENT ILLNESS    Preston was seen at the behest of Anupam Collazo for globus sensation.  This is a follow up from recent hospitalization. Continues  to have occasional globus.  No dyphagia or odynophagia. No hoarsness.  CT neck done during hospital stay demonstrated epiglottic cysts.        REVIEW OF SYSTEMS    Review of Systems as per HPI and PMHx, otherwise 10 system review system are negative.     Patient has no known allergies.     There were no vitals taken for this visit.    HEAD: Normal appearance and symmetry:  No cutaneous lesions.      NECK:  supple     EARS: normal TM, EACs    EYES:  EOMI    CN VII/XII:  intact     NOSE:     Dorsum:   straight  Septum:  midline  Mucosa:  moist        ORAL CAVITY/OROPHARYNX:     Lips:  Normal.  Tongue: normal, midline  Mucosa:   no lesions     NECK:  Trachea:  midline.              Thyroid:  normal              Adenopathy:  none        NEURO:   Alert and Oriented     GAIT AND STATION:  normal     RESPIRATORY:   Symmetry and Respiratory effort     PSYCH:  Normal mood and affect     SKIN:   warm and dry         FLEX LARYNGOSCOPY:    After obtaining consent, a flexible laryngoscope is used to examine both nasal cavities, the nasopharynx, pharnx, and larynx.      Nasal cavity: wnl  NP: wnl  Pharnx: wnl  Larynx: wnl; 1.5 cm epiglottic cysts noted (midline and to the right)    IMPRESSION:    Encounter Diagnoses   Name Primary?     Globus sensation Yes     Epiglottic cyst         Patient with incidental findings of epiglottic cyst.  Since this is since his globus sensation symptoms are intermittent I do not think they are related to the cysts which are are likely incidental finding.  No evidence of foreign body on scope today.  He is okay to follow-up on an as-needed basis.   RECOMMENDATIONS:      Orders Placed This Encounter   Procedures     LARYNGOSCOPY FLEX FIBEROPTIC, DIAGNOSTIC      Return as needed.

## 2022-06-10 NOTE — LETTER
6/10/2022         RE: Preston Roger  [address] [place] [address]        Dear Colleague,    Thank you for referring your patient, Preston Roger, to the [facility]. Please see a copy of my visit note below.    CHIEF COMPLAINT:        HISTORY OF PRESENT ILLNESS    Preston was seen at the behest of Anupam Collazo for globus sensation.  This is a follow up from recent hospitalization. Continues  to have occasional globus.  No dyphagia or odynophagia. No hoarsness.  CT neck done during hospital stay demonstrated epiglottic cysts.        REVIEW OF SYSTEMS    Review of Systems as per HPI and PMHx, otherwise 10 system review system are negative.     Patient has no known allergies.     There were no vitals taken for this visit.    HEAD: Normal appearance and symmetry:  No cutaneous lesions.      NECK:  supple     EARS: normal TM, EACs    EYES:  EOMI    CN VII/XII:  intact     NOSE:     Dorsum:   straight  Septum:  midline  Mucosa:  moist        ORAL CAVITY/OROPHARYNX:     Lips:  Normal.  Tongue: normal, midline  Mucosa:   no lesions     NECK:  Trachea:  midline.              Thyroid:  normal              Adenopathy:  none        NEURO:   Alert and Oriented     GAIT AND STATION:  normal     RESPIRATORY:   Symmetry and Respiratory effort     PSYCH:  Normal mood and affect     SKIN:   warm and dry         FLEX LARYNGOSCOPY:    After obtaining consent, a flexible laryngoscope is used to examine both nasal cavities, the nasopharynx, pharnx, and larynx.      Nasal cavity: wnl  NP: wnl  Pharnx: wnl  Larynx: wnl; 1.5 cm epiglottic cysts noted (midline and to the right)    IMPRESSION:    Encounter Diagnoses   Name Primary?     Globus sensation Yes     Epiglottic cyst         Patient with incidental findings of epiglottic cyst.  Since this is since his globus sensation symptoms are intermittent I do not think they are related to the cysts which are are likely incidental finding.  No evidence of foreign body on  scope today.  He is okay to follow-up on an as-needed basis.   RECOMMENDATIONS:      Orders Placed This Encounter   Procedures     LARYNGOSCOPY FLEX FIBEROPTIC, DIAGNOSTIC      Return as needed.         Again, thank you for allowing me to participate in the care of your patient.        Sincerely,        Kody Awad MD

## 2022-10-02 ENCOUNTER — HEALTH MAINTENANCE LETTER (OUTPATIENT)
Age: 32
End: 2022-10-02

## 2023-08-03 ENCOUNTER — LAB REQUISITION (OUTPATIENT)
Dept: LAB | Facility: CLINIC | Age: 33
End: 2023-08-03

## 2023-08-03 DIAGNOSIS — R10.31 RIGHT LOWER QUADRANT PAIN: ICD-10-CM

## 2023-08-03 DIAGNOSIS — K62.5 HEMORRHAGE OF ANUS AND RECTUM: ICD-10-CM

## 2023-08-03 LAB
ALBUMIN SERPL BCG-MCNC: 5 G/DL (ref 3.5–5.2)
ALP SERPL-CCNC: 65 U/L (ref 40–129)
ALT SERPL W P-5'-P-CCNC: 48 U/L (ref 0–70)
ANION GAP SERPL CALCULATED.3IONS-SCNC: 13 MMOL/L (ref 7–15)
AST SERPL W P-5'-P-CCNC: 30 U/L (ref 0–45)
BILIRUB SERPL-MCNC: 1 MG/DL
BUN SERPL-MCNC: 16 MG/DL (ref 6–20)
CALCIUM SERPL-MCNC: 9.5 MG/DL (ref 8.6–10)
CHLORIDE SERPL-SCNC: 103 MMOL/L (ref 98–107)
CREAT SERPL-MCNC: 0.71 MG/DL (ref 0.67–1.17)
CRP SERPL-MCNC: <3 MG/L
DEPRECATED HCO3 PLAS-SCNC: 22 MMOL/L (ref 22–29)
FERRITIN SERPL-MCNC: 933 NG/ML (ref 31–409)
GFR SERPL CREATININE-BSD FRML MDRD: >90 ML/MIN/1.73M2
GLUCOSE SERPL-MCNC: 83 MG/DL (ref 70–99)
IRON BINDING CAPACITY (ROCHE): 279 UG/DL (ref 240–430)
IRON SATN MFR SERPL: 67 % (ref 15–46)
IRON SERPL-MCNC: 187 UG/DL (ref 61–157)
POTASSIUM SERPL-SCNC: 4 MMOL/L (ref 3.4–5.3)
PROT SERPL-MCNC: 7.3 G/DL (ref 6.4–8.3)
SODIUM SERPL-SCNC: 138 MMOL/L (ref 136–145)

## 2023-08-03 PROCEDURE — 80053 COMPREHEN METABOLIC PANEL: CPT | Performed by: FAMILY MEDICINE

## 2023-08-03 PROCEDURE — 86140 C-REACTIVE PROTEIN: CPT | Performed by: FAMILY MEDICINE

## 2023-08-03 PROCEDURE — 83550 IRON BINDING TEST: CPT | Performed by: FAMILY MEDICINE

## 2023-08-03 PROCEDURE — 82728 ASSAY OF FERRITIN: CPT | Performed by: FAMILY MEDICINE

## 2023-10-21 ENCOUNTER — HEALTH MAINTENANCE LETTER (OUTPATIENT)
Age: 33
End: 2023-10-21

## 2024-03-06 ENCOUNTER — LAB REQUISITION (OUTPATIENT)
Dept: LAB | Facility: CLINIC | Age: 34
End: 2024-03-06

## 2024-03-06 DIAGNOSIS — R10.13 EPIGASTRIC PAIN: ICD-10-CM

## 2024-03-06 DIAGNOSIS — K92.1 MELENA: ICD-10-CM

## 2024-03-06 PROCEDURE — 83690 ASSAY OF LIPASE: CPT | Performed by: STUDENT IN AN ORGANIZED HEALTH CARE EDUCATION/TRAINING PROGRAM

## 2024-03-06 PROCEDURE — 82728 ASSAY OF FERRITIN: CPT | Performed by: STUDENT IN AN ORGANIZED HEALTH CARE EDUCATION/TRAINING PROGRAM

## 2024-03-06 PROCEDURE — 80053 COMPREHEN METABOLIC PANEL: CPT | Performed by: STUDENT IN AN ORGANIZED HEALTH CARE EDUCATION/TRAINING PROGRAM

## 2024-03-07 LAB
ALBUMIN SERPL BCG-MCNC: 5 G/DL (ref 3.5–5.2)
ALP SERPL-CCNC: 79 U/L (ref 40–150)
ALT SERPL W P-5'-P-CCNC: 56 U/L (ref 0–70)
ANION GAP SERPL CALCULATED.3IONS-SCNC: 12 MMOL/L (ref 7–15)
AST SERPL W P-5'-P-CCNC: 32 U/L (ref 0–45)
BILIRUB SERPL-MCNC: 0.6 MG/DL
BUN SERPL-MCNC: 11.1 MG/DL (ref 6–20)
CALCIUM SERPL-MCNC: 9.2 MG/DL (ref 8.6–10)
CHLORIDE SERPL-SCNC: 107 MMOL/L (ref 98–107)
CREAT SERPL-MCNC: 0.71 MG/DL (ref 0.67–1.17)
DEPRECATED HCO3 PLAS-SCNC: 24 MMOL/L (ref 22–29)
EGFRCR SERPLBLD CKD-EPI 2021: >90 ML/MIN/1.73M2
FERRITIN SERPL-MCNC: 773 NG/ML (ref 31–409)
GLUCOSE SERPL-MCNC: 98 MG/DL (ref 70–99)
LIPASE SERPL-CCNC: 21 U/L (ref 13–60)
POTASSIUM SERPL-SCNC: 3.9 MMOL/L (ref 3.4–5.3)
PROT SERPL-MCNC: 7.6 G/DL (ref 6.4–8.3)
SODIUM SERPL-SCNC: 143 MMOL/L (ref 135–145)

## 2024-12-14 ENCOUNTER — HEALTH MAINTENANCE LETTER (OUTPATIENT)
Age: 34
End: 2024-12-14